# Patient Record
Sex: FEMALE | Race: WHITE | Employment: OTHER | URBAN - NONMETROPOLITAN AREA
[De-identification: names, ages, dates, MRNs, and addresses within clinical notes are randomized per-mention and may not be internally consistent; named-entity substitution may affect disease eponyms.]

---

## 2019-04-02 ENCOUNTER — APPOINTMENT (OUTPATIENT)
Dept: GENERAL RADIOLOGY | Age: 84
DRG: 190 | End: 2019-04-02
Payer: MEDICARE

## 2019-04-02 ENCOUNTER — APPOINTMENT (OUTPATIENT)
Dept: CT IMAGING | Age: 84
DRG: 190 | End: 2019-04-02
Payer: MEDICARE

## 2019-04-02 ENCOUNTER — HOSPITAL ENCOUNTER (INPATIENT)
Age: 84
LOS: 5 days | Discharge: NURSING, REHAB OR LONG-TERM CARE | DRG: 190 | End: 2019-04-07
Attending: EMERGENCY MEDICINE | Admitting: FAMILY MEDICINE
Payer: MEDICARE

## 2019-04-02 DIAGNOSIS — J44.1 COPD EXACERBATION (HCC): Primary | ICD-10-CM

## 2019-04-02 DIAGNOSIS — R09.02 HYPOXIA: ICD-10-CM

## 2019-04-02 PROBLEM — M79.606 LEG PAIN: Status: ACTIVE | Noted: 2019-04-02

## 2019-04-02 PROBLEM — J96.01 ACUTE HYPOXEMIC RESPIRATORY FAILURE (HCC): Status: ACTIVE | Noted: 2019-04-02

## 2019-04-02 PROBLEM — I10 ESSENTIAL HYPERTENSION: Status: ACTIVE | Noted: 2019-04-02

## 2019-04-02 LAB
ALBUMIN SERPL-MCNC: 4.4 G/DL (ref 3.5–5.2)
ALP BLD-CCNC: 107 U/L (ref 35–104)
ALT SERPL-CCNC: 12 U/L (ref 5–33)
ANION GAP SERPL CALCULATED.3IONS-SCNC: 13 MMOL/L (ref 7–19)
APTT: 25.8 SEC (ref 26–36.2)
AST SERPL-CCNC: 16 U/L (ref 5–32)
BASE EXCESS ARTERIAL: 3.7 MMOL/L (ref -2–2)
BILIRUB SERPL-MCNC: 0.4 MG/DL (ref 0.2–1.2)
BUN BLDV-MCNC: 19 MG/DL (ref 8–23)
CALCIUM SERPL-MCNC: 9.6 MG/DL (ref 8.2–9.6)
CARBOXYHEMOGLOBIN ARTERIAL: 2.1 % (ref 0–5)
CHLORIDE BLD-SCNC: 102 MMOL/L (ref 98–111)
CO2: 28 MMOL/L (ref 22–29)
CREAT SERPL-MCNC: 0.8 MG/DL (ref 0.5–0.9)
EKG P AXIS: 83 DEGREES
EKG P-R INTERVAL: 190 MS
EKG Q-T INTERVAL: 336 MS
EKG QRS DURATION: 78 MS
EKG QTC CALCULATION (BAZETT): 421 MS
EKG T AXIS: 66 DEGREES
GFR NON-AFRICAN AMERICAN: >60
GLUCOSE BLD-MCNC: 140 MG/DL (ref 74–109)
HCO3 ARTERIAL: 28.5 MMOL/L (ref 22–26)
HCT VFR BLD CALC: 44.3 % (ref 37–47)
HEMOGLOBIN, ART, EXTENDED: 12.7 G/DL (ref 12–16)
HEMOGLOBIN: 13.8 G/DL (ref 12–16)
INR BLD: 0.94 (ref 0.88–1.18)
LACTIC ACID: 0.8 MMOL/L (ref 0.5–1.9)
MCH RBC QN AUTO: 27.8 PG (ref 27–31)
MCHC RBC AUTO-ENTMCNC: 31.2 G/DL (ref 33–37)
MCV RBC AUTO: 89.3 FL (ref 81–99)
METHEMOGLOBIN ARTERIAL: 1.2 %
O2 CONTENT ARTERIAL: 15.2 ML/DL
O2 SAT, ARTERIAL: 85.2 %
O2 THERAPY: ABNORMAL
PCO2 ARTERIAL: 43 MMHG (ref 35–45)
PDW BLD-RTO: 14.3 % (ref 11.5–14.5)
PH ARTERIAL: 7.43 (ref 7.35–7.45)
PLATELET # BLD: 166 K/UL (ref 130–400)
PMV BLD AUTO: 11.2 FL (ref 9.4–12.3)
PO2 ARTERIAL: 45 MMHG (ref 80–100)
POTASSIUM SERPL-SCNC: 3.9 MMOL/L (ref 3.5–5)
POTASSIUM, WHOLE BLOOD: 3.8
PRO-BNP: 459 PG/ML (ref 0–1800)
PROTHROMBIN TIME: 12 SEC (ref 12–14.6)
RAPID INFLUENZA  B AGN: NEGATIVE
RAPID INFLUENZA A AGN: NEGATIVE
RBC # BLD: 4.96 M/UL (ref 4.2–5.4)
SODIUM BLD-SCNC: 143 MMOL/L (ref 136–145)
TOTAL PROTEIN: 7.8 G/DL (ref 6.6–8.7)
TROPONIN: <0.01 NG/ML (ref 0–0.03)
WBC # BLD: 9.2 K/UL (ref 4.8–10.8)

## 2019-04-02 PROCEDURE — 99285 EMERGENCY DEPT VISIT HI MDM: CPT

## 2019-04-02 PROCEDURE — 6360000002 HC RX W HCPCS: Performed by: FAMILY MEDICINE

## 2019-04-02 PROCEDURE — 6370000000 HC RX 637 (ALT 250 FOR IP): Performed by: FAMILY MEDICINE

## 2019-04-02 PROCEDURE — 85730 THROMBOPLASTIN TIME PARTIAL: CPT

## 2019-04-02 PROCEDURE — 87804 INFLUENZA ASSAY W/OPTIC: CPT

## 2019-04-02 PROCEDURE — 2500000003 HC RX 250 WO HCPCS: Performed by: EMERGENCY MEDICINE

## 2019-04-02 PROCEDURE — 6370000000 HC RX 637 (ALT 250 FOR IP): Performed by: EMERGENCY MEDICINE

## 2019-04-02 PROCEDURE — 87040 BLOOD CULTURE FOR BACTERIA: CPT

## 2019-04-02 PROCEDURE — 2580000003 HC RX 258: Performed by: EMERGENCY MEDICINE

## 2019-04-02 PROCEDURE — 96374 THER/PROPH/DIAG INJ IV PUSH: CPT

## 2019-04-02 PROCEDURE — 2700000000 HC OXYGEN THERAPY PER DAY

## 2019-04-02 PROCEDURE — 94762 N-INVAS EAR/PLS OXIMTRY CONT: CPT

## 2019-04-02 PROCEDURE — 84132 ASSAY OF SERUM POTASSIUM: CPT

## 2019-04-02 PROCEDURE — 6360000004 HC RX CONTRAST MEDICATION: Performed by: EMERGENCY MEDICINE

## 2019-04-02 PROCEDURE — 93005 ELECTROCARDIOGRAM TRACING: CPT

## 2019-04-02 PROCEDURE — 36415 COLL VENOUS BLD VENIPUNCTURE: CPT

## 2019-04-02 PROCEDURE — 6360000002 HC RX W HCPCS: Performed by: EMERGENCY MEDICINE

## 2019-04-02 PROCEDURE — 80053 COMPREHEN METABOLIC PANEL: CPT

## 2019-04-02 PROCEDURE — 99223 1ST HOSP IP/OBS HIGH 75: CPT | Performed by: FAMILY MEDICINE

## 2019-04-02 PROCEDURE — 71275 CT ANGIOGRAPHY CHEST: CPT

## 2019-04-02 PROCEDURE — 36600 WITHDRAWAL OF ARTERIAL BLOOD: CPT

## 2019-04-02 PROCEDURE — 85027 COMPLETE CBC AUTOMATED: CPT

## 2019-04-02 PROCEDURE — 94664 DEMO&/EVAL PT USE INHALER: CPT

## 2019-04-02 PROCEDURE — 85610 PROTHROMBIN TIME: CPT

## 2019-04-02 PROCEDURE — 83605 ASSAY OF LACTIC ACID: CPT

## 2019-04-02 PROCEDURE — 99285 EMERGENCY DEPT VISIT HI MDM: CPT | Performed by: EMERGENCY MEDICINE

## 2019-04-02 PROCEDURE — 83880 ASSAY OF NATRIURETIC PEPTIDE: CPT

## 2019-04-02 PROCEDURE — 82803 BLOOD GASES ANY COMBINATION: CPT

## 2019-04-02 PROCEDURE — 94640 AIRWAY INHALATION TREATMENT: CPT

## 2019-04-02 PROCEDURE — 84484 ASSAY OF TROPONIN QUANT: CPT

## 2019-04-02 PROCEDURE — 71045 X-RAY EXAM CHEST 1 VIEW: CPT

## 2019-04-02 PROCEDURE — 1210000000 HC MED SURG R&B

## 2019-04-02 PROCEDURE — 92610 EVALUATE SWALLOWING FUNCTION: CPT

## 2019-04-02 RX ORDER — LATANOPROST 50 UG/ML
1 SOLUTION/ DROPS OPHTHALMIC NIGHTLY
COMMUNITY

## 2019-04-02 RX ORDER — AMLODIPINE BESYLATE 5 MG/1
5 TABLET ORAL 2 TIMES DAILY
COMMUNITY

## 2019-04-02 RX ORDER — BUDESONIDE 0.25 MG/2ML
0.25 INHALANT ORAL 2 TIMES DAILY
Status: DISCONTINUED | OUTPATIENT
Start: 2019-04-02 | End: 2019-04-07 | Stop reason: HOSPADM

## 2019-04-02 RX ORDER — METHYLPREDNISOLONE SODIUM SUCCINATE 125 MG/2ML
125 INJECTION, POWDER, LYOPHILIZED, FOR SOLUTION INTRAMUSCULAR; INTRAVENOUS ONCE
Status: COMPLETED | OUTPATIENT
Start: 2019-04-02 | End: 2019-04-02

## 2019-04-02 RX ORDER — FUROSEMIDE 20 MG/1
20 TABLET ORAL DAILY
COMMUNITY

## 2019-04-02 RX ORDER — ONDANSETRON 2 MG/ML
4 INJECTION INTRAMUSCULAR; INTRAVENOUS EVERY 6 HOURS PRN
Status: DISCONTINUED | OUTPATIENT
Start: 2019-04-02 | End: 2019-04-07 | Stop reason: HOSPADM

## 2019-04-02 RX ORDER — SODIUM CHLORIDE 0.9 % (FLUSH) 0.9 %
10 SYRINGE (ML) INJECTION EVERY 12 HOURS SCHEDULED
Status: DISCONTINUED | OUTPATIENT
Start: 2019-04-02 | End: 2019-04-07 | Stop reason: HOSPADM

## 2019-04-02 RX ORDER — ACETAMINOPHEN 325 MG/1
650 TABLET ORAL EVERY 4 HOURS PRN
Status: DISCONTINUED | OUTPATIENT
Start: 2019-04-02 | End: 2019-04-07 | Stop reason: HOSPADM

## 2019-04-02 RX ORDER — IPRATROPIUM BROMIDE AND ALBUTEROL SULFATE 2.5; .5 MG/3ML; MG/3ML
1 SOLUTION RESPIRATORY (INHALATION) ONCE
Status: COMPLETED | OUTPATIENT
Start: 2019-04-02 | End: 2019-04-02

## 2019-04-02 RX ORDER — OMEPRAZOLE 20 MG/1
20 CAPSULE, DELAYED RELEASE ORAL DAILY
COMMUNITY

## 2019-04-02 RX ORDER — IPRATROPIUM BROMIDE AND ALBUTEROL SULFATE 2.5; .5 MG/3ML; MG/3ML
1 SOLUTION RESPIRATORY (INHALATION)
Status: DISCONTINUED | OUTPATIENT
Start: 2019-04-02 | End: 2019-04-07 | Stop reason: HOSPADM

## 2019-04-02 RX ORDER — ALBUTEROL SULFATE 2.5 MG/3ML
2.5 SOLUTION RESPIRATORY (INHALATION) 4 TIMES DAILY
Status: DISCONTINUED | OUTPATIENT
Start: 2019-04-02 | End: 2019-04-02

## 2019-04-02 RX ORDER — IPRATROPIUM BROMIDE AND ALBUTEROL SULFATE 2.5; .5 MG/3ML; MG/3ML
1 SOLUTION RESPIRATORY (INHALATION) ONCE
Status: DISCONTINUED | OUTPATIENT
Start: 2019-04-02 | End: 2019-04-02

## 2019-04-02 RX ORDER — MULTIVIT,THER.W-IRON,HEMATINIC 66.7-.33MG
1 TABLET ORAL DAILY
COMMUNITY

## 2019-04-02 RX ORDER — IPRATROPIUM BROMIDE AND ALBUTEROL SULFATE 2.5; .5 MG/3ML; MG/3ML
1 SOLUTION RESPIRATORY (INHALATION) EVERY 6 HOURS
COMMUNITY

## 2019-04-02 RX ORDER — SODIUM CHLORIDE 0.9 % (FLUSH) 0.9 %
10 SYRINGE (ML) INJECTION PRN
Status: DISCONTINUED | OUTPATIENT
Start: 2019-04-02 | End: 2019-04-07 | Stop reason: HOSPADM

## 2019-04-02 RX ORDER — METHYLPREDNISOLONE SODIUM SUCCINATE 40 MG/ML
40 INJECTION, POWDER, LYOPHILIZED, FOR SOLUTION INTRAMUSCULAR; INTRAVENOUS DAILY
Status: DISCONTINUED | OUTPATIENT
Start: 2019-04-02 | End: 2019-04-07

## 2019-04-02 RX ORDER — BUDESONIDE AND FORMOTEROL FUMARATE DIHYDRATE 80; 4.5 UG/1; UG/1
2 AEROSOL RESPIRATORY (INHALATION) 2 TIMES DAILY
COMMUNITY

## 2019-04-02 RX ORDER — DORZOLAMIDE HCL 20 MG/ML
2 SOLUTION/ DROPS OPHTHALMIC 2 TIMES DAILY
COMMUNITY

## 2019-04-02 RX ORDER — DORZOLAMIDE HCL 20 MG/ML
2 SOLUTION/ DROPS OPHTHALMIC 3 TIMES DAILY
Status: DISCONTINUED | OUTPATIENT
Start: 2019-04-02 | End: 2019-04-07 | Stop reason: HOSPADM

## 2019-04-02 RX ORDER — LATANOPROST 50 UG/ML
1 SOLUTION/ DROPS OPHTHALMIC 2 TIMES DAILY
Status: DISCONTINUED | OUTPATIENT
Start: 2019-04-02 | End: 2019-04-07 | Stop reason: HOSPADM

## 2019-04-02 RX ADMIN — IOPAMIDOL 90 ML: 755 INJECTION, SOLUTION INTRAVENOUS at 08:08

## 2019-04-02 RX ADMIN — BUDESONIDE 250 MCG: 0.25 SUSPENSION RESPIRATORY (INHALATION) at 19:30

## 2019-04-02 RX ADMIN — METHYLPREDNISOLONE SODIUM SUCCINATE 125 MG: 125 INJECTION, POWDER, FOR SOLUTION INTRAMUSCULAR; INTRAVENOUS at 07:39

## 2019-04-02 RX ADMIN — DOXYCYCLINE 100 MG: 100 INJECTION, POWDER, LYOPHILIZED, FOR SOLUTION INTRAVENOUS at 23:08

## 2019-04-02 RX ADMIN — IPRATROPIUM BROMIDE AND ALBUTEROL SULFATE 1 AMPULE: .5; 3 SOLUTION RESPIRATORY (INHALATION) at 06:50

## 2019-04-02 RX ADMIN — ACETAMINOPHEN 650 MG: 325 TABLET, FILM COATED ORAL at 17:34

## 2019-04-02 RX ADMIN — ENOXAPARIN SODIUM 40 MG: 100 INJECTION SUBCUTANEOUS at 16:27

## 2019-04-02 RX ADMIN — METHYLPREDNISOLONE SODIUM SUCCINATE 40 MG: 40 INJECTION, POWDER, FOR SOLUTION INTRAMUSCULAR; INTRAVENOUS at 16:26

## 2019-04-02 RX ADMIN — ONDANSETRON 4 MG: 2 INJECTION INTRAMUSCULAR; INTRAVENOUS at 16:26

## 2019-04-02 RX ADMIN — LATANOPROST 1 DROP: 50 SOLUTION OPHTHALMIC at 16:27

## 2019-04-02 RX ADMIN — DOXYCYCLINE 100 MG: 100 INJECTION, POWDER, LYOPHILIZED, FOR SOLUTION INTRAVENOUS at 10:07

## 2019-04-02 RX ADMIN — IPRATROPIUM BROMIDE AND ALBUTEROL SULFATE 1 AMPULE: .5; 3 SOLUTION RESPIRATORY (INHALATION) at 19:29

## 2019-04-02 SDOH — HEALTH STABILITY: MENTAL HEALTH: HOW OFTEN DO YOU HAVE A DRINK CONTAINING ALCOHOL?: NEVER

## 2019-04-02 ASSESSMENT — ENCOUNTER SYMPTOMS
RHINORRHEA: 0
SORE THROAT: 0
COUGH: 1
BACK PAIN: 0
ABDOMINAL PAIN: 0
VOMITING: 0
NAUSEA: 0
DIARRHEA: 0
WHEEZING: 1
SHORTNESS OF BREATH: 1

## 2019-04-02 ASSESSMENT — PAIN SCALES - GENERAL: PAINLEVEL_OUTOF10: 9

## 2019-04-02 NOTE — ED NOTES
ASSESSMENT:    PT ALERT/ORIENTED X4. PUPILS EQUAL/REACTIVE    SKIN:  WARM/DRY PINK CAPILLARY REFILL < 2SECS    CARDIAC:  S1 S2 NOTED     LUNGS: decreased sounds UPPER AND LOWER LOBES, RESPIRATIONS EVEN/UNLABORED nonproductive cough noted     ABDOMEN: BOWEL SOUNDS NOTED UPPER AND LOWER QUADRANTS                     SOFT AND NONTENDER. EXTREMITIES:  BILATERAL DP AND PT AND NO EDEMA NOTED. NO DISTRESS NOTED. SIDE RAILS UP AND CALL LIGHT IN REACH.      Antony Blanco RN  04/02/19 1880

## 2019-04-02 NOTE — LETTER
Beneficiary Notification Letter     This [facility] Provider is Participating in an Innovative Payment and [address] from Zainab Glover:   JO ANN SAGASTUME is participating in a Medicare initiative called the [facility] for [address] [facility]. You are receiving this letter because your health care provider has identified you as a patient who is receiving care through this initiative. Health care providers participating in the [facility] [address] [facility], including JO ANN SAGASTUME, will work with Medicare to improve care for patients. Your Medicare rights have not been changed. You still have all the same Medicare rights and protections, including the right to choose which hospital, doctor, or other health care provider you see. However, because JO ANN SAGASTUME chose to participate in the [facility], all Medicare beneficiaries who meet the eligibility criteria of this initiative will receive care under the initiative. If you do not wish to receive care under the [employer] [facility] [address] [facility], you must choose a health care provider that does not participate in this initiative for your care. Regardless of which health care provider you see, Medicare will continue to cover all of your medically necessary services. Bundled Payments for Care Improvement Advanced aims to help improve your care     The [employer] [facility] [address] is an innovative Medicare initiative that encourages your doctors, hospitals, and other health care providers to work more closely together so you get better care during and following certain hospital stays.  In this initiative, doctors and hospitals may work closely with certain health care providers and suppliers that help patients recover after discharge from the hospital, · To find a different doctor, visit Medicare's Physician Compare website, HDTapes.co.nz, or call 1-800-MEDICARE (117 2715). TTY users should call 3-293.136.5274. · To find a different skilled nursing facility, visit [facility] website, https://www.5 Minutes.Allurent/, or call 1-800-MEDICARE (1- 652.473.9826). TTY users should call 7-647.494.5688. · To find a different long term care hospital, visit [facility] Compare website, SolsticelogMaxCDN.be, or call 1-800- MEDICARE (593 0207). TTY users should call 8-675.198.5382. · To find a different inpatient rehabilitation facility, visit [facility] Compare website, www.medicare.gov/ inpatientrehabilitation facilitycompare, or call 1-800-MEDICARE (8-809.676.4649). TTY users should call 8- 802.197.5992. · To find a different home health agency, visit [address] website, www.medicare.gov/homehealthcompare, or call 1-800-MEDICARE (3-266- 411-1547). TTY users should call 6-309.744.3918.

## 2019-04-02 NOTE — PROGRESS NOTES
Speech Language Pathology  Facility/Department: [facility] 4 ONCOLOGY UNIT   BEDSIDE SWALLOW EVALUATION    NAME: Tabby Bliss  : 1924  MRN: [id number]    ADMISSION DATE: 2019  ADMITTING DIAGNOSIS: has Hypoxemia; Leg pain; Acute hypoxemic respiratory failure (HCC); COPD exacerbation ([facility] [address] and Essential hypertension on their problem list.    Date of Eval: 2019  Evaluating Therapist: Kelle Kruse    Current Diet level:  Current Diet : NPO  Current Liquid Diet : NPO    Reason for Referral  Tabby Bliss was referred for a bedside swallow evaluation to assess the efficiency of her swallow function, identify signs and symptoms of aspiration and make recommendations regarding safe dietary consistencies, effective compensatory strategies, and safe eating environment. Impression  Assessed patient's swallowing function. Patient exhibits slow, decreased oral prep of even blended food consistency, fast oral transit and suspected swallow delay with thin liquids, and sluggish, mild-moderately decreased laryngeal elevation for swallow airway protection. Even so, no outward S/S penetration/aspiration was noted with any ice chip trial, puree consistency trial, nectar thick H2O trial, or thin H2O trial administered during evaluation this date. At this time, question fatigue. Would trial full liquid diet with nectar thick liquids. Recommend meds crushed in pudding or applesauce. TOTAL FEED. If patient receives mouth care prior to intake, okay for ice chips IN BETWEEN MEALS for comfort. Thank you for this consult. Treatment Plan  Requires SLP Intervention: Yes    Recommended Diet and Intervention  Liquid Consistency Recommendation: Full(With nectar thick)  Recommended Form of Meds: Crushed in puree as able  Therapeutic Interventions: Patient/Family education;Diet tolerance monitoring; Therapeutic PO trials with SLP    Compensatory Swallowing Strategies  Compensatory Swallowing Strategies: Upright as possible for all oral intake; Total feed;Small bites/sips;Eat/Feed slowly; Alternate solids and liquids; Remain upright for 30-45 minutes after meals    Treatment/Goals  Timeframe for Short-term Goals: 1x/day for 3 days   Goal 1: Patient will tolerate full liquid diet with nectar thick liquids with min S/S penetration/aspiration during PO intake. Goal 2: Patient staff will follow swallow safety recommendations to decrease risk of penetration/aspiration during PO intake. Goal 3: Re-assessment of swallow function for potential diet upgrade. General  Chart Reviewed: Yes  Behavior/Cognition: Alert; Cooperative;Pleasant mood  O2 Device: Venturi mask  Communication Observation: (Patient exhibited min verbalizations. What patient verbalized, low volume was noted.)  Follows Directions: Simple  Patient Positioning: Upright in bed  Volitional Cough: Weak  Volitional Swallow: Delayed  Consistencies Administered: Ice Chips;Puree;Nectar - straw; Thin - straw    Assessed patient's swallowing function with the following observations noted;    Oral Phase Dysfunction  Oral Phase: Exceptions  Oral Phase Dysfunction  Impaired Mastication: Ice chips;Puree(Patient exhibited slow oral prep with primarily decreased vertical jaw movement noted during single ice chip trials and puree consistency trials presented by SLP. )  Suspected Premature Bolus Loss: Thin - straw(Patient exhibited fast oral transit of thin H2O trials presented via straw by SLP. )  Oral Phase - Comment: Oral transit of ice chip trials primarily measured 1-2 seconds in length. Oral transit of puree consistency trials, presented by SLP, primarily measured 1-2 seconds in length and no oral cavity residue was noted post swallows. Oral transit of nectar thick H2O trials, presented via straw by SLP, primarily measured 1 second in length. Indicators of Pharyngeal Phase Dysfunction  Pharyngeal Phase: Exceptions  Indicators of Pharyngeal Phase Dysfunction  Delayed Swallow:  Thin - straw(Suspect secondary to oral transit times.)  Decreased Laryngeal Elevation: (Laryngeal elevation was considered to be sluggish and mild-moderately decreased for swallow airway protection.)   Pharyngeal: No outward S/S penetration/aspiration was noted with any ice chip trial, puree consistency trial, or nectar thick H2O trial. Despite exhibiting fast oral transit and suspected swallow delay, no outward S/S penetration/aspiration was observed with any thin H2O trial.     At this time, question fatigue. Would trial full liquid diet with nectar thick liquids. Recommend meds crushed in pudding or applesauce. TOTAL FEED. If patient receives mouth care prior to intake, okay for ice chips IN BETWEEN MEALS for comfort.      Electronically signed by SEVEN Farias on 4/2/2019 at 3:19 PM

## 2019-04-02 NOTE — PROGRESS NOTES
Patient daughter, Jose Serrato, [phone].  Electronically signed by Kimberlyn Daly RN on 4/2/2019 at 3:46 PM

## 2019-04-02 NOTE — H&P
ophthalmic solution Place 2 drops into both eyes 3 times daily   Yes Historical Provider, MD   albuterol-ipratropium (COMBIVENT RESPIMAT)  MCG/ACT AERS inhaler Inhale 1 puff into the lungs every 6 hours   Yes Historical Provider, MD     Allergies:    Patient has no known allergies. Social History:    The patient currently lives at [facility]  Tobacco:   reports that she has never smoked. She has never used smokeless tobacco.  Alcohol:   reports that she does not drink alcohol. Illicit Drugs: Never    Family History:      Problem Relation Age of Onset    Heart Disease Mother     Heart Attack Mother     Prostate Cancer Father        Review of Systems:   Constitutional / general:  No fever / chills / sweats  HEENT: No sore throat / hoarseness / vision changes  CV:  No chest pain / palpitations/ orthopnea   Respiratory:  No sputum / hemoptysis  GI: No nausea / vomiting / abdominal pain / diarrhea / constipation  :  No dysuria / hesitancy / urgency / hematuria   Neuro: No muscle weakness / dysphagia / headache / paresthesias  Musculoskeletal:  No edema / cyanosis / pain  Psychiatric:  No depression / anxiety / insomnia  Skin:  No new rashes / lesions    Physical Examination:            BP (!) 146/79   Pulse 100   Temp 98.9 °F (37.2 °C)   Resp 20   Ht 5' 5\" (1.651 m)   Wt 107 lb (48.5 kg)   SpO2 95%   BMI 17.81 kg/m²   General appearance: Dyspneic at rest, appears stated age and cooperative. Well developed and well groomed. Elderly and debilitated female. HEENT: Normal cephalic, atraumatic without obvious deformity. Pupils equal, round, and reactive to light. Extra ocular muscles intact. Conjunctivae/corneas clear. Normal ears and nose. Hearing impaired. Lips with no blisters. Neck: Supple, with full range of motion. Trachea midline. Thyroid no masses noted. Respiratory: Increased respiratory effort. Bilateral wheezing.   Cardiovascular: Regular rate and rhythm with normal S1/S2 without murmurs, rubs or gallops. Capillary refill Normal. Pulses symmetric in upper extremities. Abdomen: Soft, non-tender, non-distended with normal bowel sounds. No organomegaly. No hernias. Musculoskeletal: No clubbing, cyanosis or edema bilaterally. Full range of motion without deformity in 4 extremities. Skin: Skin color, texture, turgor normal.  No rashes or lesions. No nodules. Neurologic:  Neurovascularly intact without any focal sensory/motor deficits. Cranial nerves: II-XII intact, grossly non-focal.  Psychiatric: Alert and oriented, thought content appropriate, normal insight. Normal memory.     Diagnostic Data:  CXR: I have reviewed the report and films with the following interpretation: Noted  CT PE: I have reviewed the report and films with the following interpretation: noted    CBC:  Recent Labs     04/02/19  0640   WBC 9.2   HGB 13.8   HCT 44.3        BMP:  Recent Labs     04/02/19  0640 04/02/19  0643     --    K 3.9 3.8     --    CO2 28  --    BUN 19  --    CREATININE 0.8  --    CALCIUM 9.6  --      Recent Labs     04/02/19  0640   AST 16   ALT 12   BILITOT 0.4   ALKPHOS 107*     Coag Panel:   Recent Labs     04/02/19  0640   INR 0.94   PROTIME 12.0   APTT 25.8*     Cardiac Enzymes:   Recent Labs     04/02/19 0640   TROPONINI <0.01     ABGs:  Lab Results   Component Value Date    PHART 7.430 04/02/2019    PO2ART 45.0 04/02/2019    FOE4RUF 43.0 04/02/2019     Urinalysis:  Lab Results   Component Value Date    NITRU NEGATIVE 02/21/2014    BACTERIA 2+ 02/21/2014    GLUCOSEU NEGATIVE 02/21/2014       Active Hospital Problems    Diagnosis Date Noted    Palliative care patient [Z51.5] 04/03/2019    Leg pain [M79.606] 04/02/2019    Acute hypoxemic respiratory failure (HCC) [J96.01] 04/02/2019    COPD exacerbation ([facility] [address] [J44.1] 04/02/2019    Essential hypertension [I10] 04/02/2019       Assessment and Plan:  Principal Problem:    Acute hypoxemic respiratory failure (HCC)  Active Problems:    Leg pain    COPD exacerbation ([facility] [address]    Essential hypertension    Palliative care patient  Resolved Problems:    * No resolved hospital problems. *      1. Admit to medical floor inpatient. Serial vitals, telemetry, diet npo, activity up with assistance. 2. IVF saline slow rate  3. Follow labs CBC/BMP  4. Order tests: Respiratory virus PCR  5. Consults: SLP/PT/OT  6. Start medications See orders  7. Home medications reconciled. 8. DVT prophylaxis with Lovenox  9. Code status DNR, discussed with patient and family  8. Oxygen 2-4 lpm nasal canula, baseline goal room air  11. Disposition medical floor  12.  Prognosis poor due to respiratory failure and advanced age      Dre Mccartney service  4/2/2019  12:44 PM

## 2019-04-02 NOTE — PROGRESS NOTES
The patient's O2 sats have been declining. The patient's HR has been in the 120s steadily. Dr. Kat Mahajan has been notified.    Electronically signed by Jalil Altman RN on 4/2/2019 at 5:58 PM

## 2019-04-02 NOTE — PROGRESS NOTES
Contains critical data Blood Gas, Arterial   Status:  Final result    Ref Range & Units 04/02/19 0643   pH, Arterial 7.350 - 7.450 7.430    pCO2, Arterial 35.0 - 45.0 mmHg 43.0    pO2, Arterial 80.0 - 100.0 mmHg 45. 0Low Panic     HCO3, Arterial 22.0 - 26.0 mmol/L 28.5High     Base Excess, Arterial -2.0 - 2.0 mmol/L 3.7High     Hemoglobin, Art, Extended 12.0 - 16.0 g/dL 12.7    O2 Sat, Arterial >92 % 85. 2Low Panic     Carboxyhgb, Arterial 0.0 - 5.0 % 2.1    Comment:      0.0-1.5   (Smokers 1.5-5.0)    Methemoglobin, Arterial <1.5 % 1.2    O2 Content, Arterial Not Established mL/dL 15.2    O2 Therapy  Unknown    Resulting Agency  [facility]   Narrative     CALL  Sanon Robert Loco RN ER, 04/02/2019 06:44, by ANABELL        Specimen Collected: 04/02/19 0643 Last Resulted: 04/02/19 0644 View Other Order Details        Pt on room air for ABG, RR 24 site RB

## 2019-04-02 NOTE — ED PROVIDER NOTES
[facility] 4 ONCOLOGY UNIT  eMERGENCY dEPARTMENT eNCOUnter      Pt Name: Elvira Carbajal  MRN: [id number]  [place] 4/2/1924  Date of evaluation: 4/2/2019  Provider: Hakeem Roy MD    [address] [place] [address]       Chief Complaint   Patient presents with    Shortness of Breath    Cough     x 2         HISTORY OF PRESENT ILLNESS   (Location/Symptom, Timing/Onset,Context/Setting, Quality, Duration, Modifying Factors, Severity)  Note limiting factors. Elvira Carbajal is a 80 y.o. female who presents to the emergency department with cough shortness of breath and wheezing and subjective fever. The patient states that she lives  nursing home. Symptoms present for 2 days and worsening. She does have a history of COPD, though nonsmoker. She does not wear home oxygen. Her room air sat was 85%. HPI    NursingNotes were reviewed. REVIEW OF SYSTEMS    (2-9 systems for level 4, 10 or more for level 5)     Review of Systems   Constitutional: Positive for activity change, appetite change, chills, diaphoresis, fatigue and fever. HENT: Negative for rhinorrhea and sore throat. Respiratory: Positive for cough, shortness of breath and wheezing. Cardiovascular: Negative for chest pain and leg swelling. Gastrointestinal: Negative for abdominal pain, diarrhea, nausea and vomiting. Genitourinary: Negative for difficulty urinating. Musculoskeletal: Negative for back pain and neck pain. Skin: Negative for rash. Neurological: Negative for weakness and headaches. Psychiatric/Behavioral: Negative for confusion. A complete review of systems was performed and is negative except as noted above in the HPI.        PAST MEDICAL HISTORY     Past Medical History:   Diagnosis Date    Anemia     Arthritis     Back pain     COPD (chronic obstructive pulmonary disease) ([facility] [address]     Glaucoma     Hypertension     Lumbar disc disease     Movement disorder     Pneumonia     Popliteal nerve injury     Reflux esophagitis Currently   Lifestyle    Physical activity:     Days per week: None     Minutes per session: None    Stress: None   Relationships    Social connections:     Talks on phone: None     Gets together: None     Attends [employer] service: None     Active member of club or organization: None     Attends meetings of clubs or organizations: None     Relationship status: None    Intimate partner violence:     Fear of current or ex partner: None     Emotionally abused: None     Physically abused: None     Forced sexual activity: None   Other Topics Concern    None   Social History Narrative    None       SCREENINGS    Jeff Coma Scale  Eye Opening: Spontaneous  Best Verbal Response: Oriented  Best Motor Response: Obeys commands  [place] Coma Scale Score: 15        PHYSICAL EXAM    (up to 7 for level 4, 8 or more for level 5)     ED Triage Vitals [04/02/19 0630]   BP Temp Temp src Pulse Resp SpO2 Height Weight   134/61 98.8 °F (37.1 °C) -- 111 (!) 33 (!) 84 % 5' 5\" (1.651 m) 107 lb (48.5 kg)       Physical Exam   Constitutional: She is oriented to person, place, and time. She appears well-developed and well-nourished. No distress. HENT:   Head: Normocephalic and atraumatic. Eyes: Pupils are equal, round, and reactive to light. Neck: Normal range of motion. Neck supple. Cardiovascular: Regular rhythm, normal heart sounds and intact distal pulses. Tachycardia present. Pulmonary/Chest: Effort normal. Tachypnea noted. No respiratory distress. She has wheezes. Abdominal: Soft. Bowel sounds are normal. She exhibits no distension. There is no tenderness. Musculoskeletal: Normal range of motion. She exhibits no edema. Neurological: She is alert and oriented to person, place, and time. No cranial nerve deficit. She exhibits normal muscle tone. Coordination normal.   Skin: Skin is warm and dry. No rash noted. She is not diaphoretic. Psychiatric: She has a normal mood and affect.  Her behavior is normal.   Nursing note and vitals reviewed. DIAGNOSTIC RESULTS     EKG: All EKG's are interpreted by the Emergency Department Physician who either signs or Co-signs this chart in the absence of a cardiologist.    Sinus tach rate 109 PVCs nonspecific ST    RADIOLOGY:   Non-plain film images such as CT, Ultrasound and MRI are read by the radiologist. Plainradiographic images are visualized and preliminarily interpreted by the emergency physician with the below findings:      Interpretation per the Radiologist below, if available at the time of this note:    CTA PULMONARY W CONTRAST   Final Result   1. No acute findings. No pulmonary embolus. No thoracic aortic   dissection. 2. Incidental cholelithiasis. Signed by Dr Romi Corrales on 4/2/2019 8:38 AM      XR CHEST PORTABLE   Final Result   1. No radiographic evidence of acute cardiopulmonary process.    Signed by Dr Rosalba Morrison on 4/2/2019 7:15 AM            ED BEDSIDE ULTRASOUND:   Performed by ED Physician - none    LABS:  Labs Reviewed   APTT - Abnormal; Notable for the following components:       Result Value    aPTT 25.8 (*)     All other components within normal limits   BLOOD GAS, ARTERIAL - Abnormal; Notable for the following components:    pO2, Arterial 45.0 (*)     HCO3, Arterial 28.5 (*)     Base Excess, Arterial 3.7 (*)     O2 Sat, Arterial 85.2 (*)     All other components within normal limits    Narrative:     CALL  Fab Wasserman RN ER, 04/02/2019 06:44, by Richard Gonzalez   CBC - Abnormal; Notable for the following components:    MCHC 31.2 (*)     All other components within normal limits   COMPREHENSIVE METABOLIC PANEL - Abnormal; Notable for the following components:    Glucose 140 (*)     Alkaline Phosphatase 107 (*)     All other components within normal limits   RAPID INFLUENZA A/B ANTIGENS   CULTURE BLOOD #1   CULTURE BLOOD #2   RESPIRATORY VIRUS PCR PANEL   BRAIN NATRIURETIC PEPTIDE   PROTIME-INR   TROPONIN   POTASSIUM, WHOLE BLOOD    Narrative:

## 2019-04-02 NOTE — ED NOTES
Bed: 07  Expected date:   Expected time:   Means of arrival:   Comments:  Kristina Stack RN  04/02/19 [id number]

## 2019-04-03 PROBLEM — Z51.5 PALLIATIVE CARE PATIENT: Status: ACTIVE | Noted: 2019-04-03

## 2019-04-03 LAB
ANION GAP SERPL CALCULATED.3IONS-SCNC: 15 MMOL/L (ref 7–19)
BASOPHILS ABSOLUTE: 0 K/UL (ref 0–0.2)
BASOPHILS RELATIVE PERCENT: 0.2 % (ref 0–1)
BUN BLDV-MCNC: 25 MG/DL (ref 8–23)
CALCIUM SERPL-MCNC: 10 MG/DL (ref 8.2–9.6)
CHLORIDE BLD-SCNC: 105 MMOL/L (ref 98–111)
CO2: 24 MMOL/L (ref 22–29)
CREAT SERPL-MCNC: 0.8 MG/DL (ref 0.5–0.9)
EOSINOPHILS ABSOLUTE: 0 K/UL (ref 0–0.6)
EOSINOPHILS RELATIVE PERCENT: 0 % (ref 0–5)
GFR NON-AFRICAN AMERICAN: >60
GLUCOSE BLD-MCNC: 171 MG/DL (ref 74–109)
HCT VFR BLD CALC: 43.1 % (ref 37–47)
HEMOGLOBIN: 13.3 G/DL (ref 12–16)
LYMPHOCYTES ABSOLUTE: 0.6 K/UL (ref 1.1–4.5)
LYMPHOCYTES RELATIVE PERCENT: 2.6 % (ref 20–40)
MCH RBC QN AUTO: 27.8 PG (ref 27–31)
MCHC RBC AUTO-ENTMCNC: 30.9 G/DL (ref 33–37)
MCV RBC AUTO: 90 FL (ref 81–99)
MONOCYTES ABSOLUTE: 1.1 K/UL (ref 0–0.9)
MONOCYTES RELATIVE PERCENT: 4.7 % (ref 0–10)
NEUTROPHILS ABSOLUTE: 21 K/UL (ref 1.5–7.5)
NEUTROPHILS RELATIVE PERCENT: 92.1 % (ref 50–65)
PDW BLD-RTO: 14.4 % (ref 11.5–14.5)
PLATELET # BLD: 186 K/UL (ref 130–400)
PMV BLD AUTO: 11.8 FL (ref 9.4–12.3)
POTASSIUM REFLEX MAGNESIUM: 4.2 MMOL/L (ref 3.5–5)
RBC # BLD: 4.79 M/UL (ref 4.2–5.4)
SODIUM BLD-SCNC: 144 MMOL/L (ref 136–145)
WBC # BLD: 22.8 K/UL (ref 4.8–10.8)

## 2019-04-03 PROCEDURE — 36415 COLL VENOUS BLD VENIPUNCTURE: CPT

## 2019-04-03 PROCEDURE — 2580000003 HC RX 258: Performed by: FAMILY MEDICINE

## 2019-04-03 PROCEDURE — 6370000000 HC RX 637 (ALT 250 FOR IP): Performed by: FAMILY MEDICINE

## 2019-04-03 PROCEDURE — 6360000002 HC RX W HCPCS: Performed by: FAMILY MEDICINE

## 2019-04-03 PROCEDURE — 94762 N-INVAS EAR/PLS OXIMTRY CONT: CPT

## 2019-04-03 PROCEDURE — 1210000000 HC MED SURG R&B

## 2019-04-03 PROCEDURE — 85025 COMPLETE CBC W/AUTO DIFF WBC: CPT

## 2019-04-03 PROCEDURE — 94640 AIRWAY INHALATION TREATMENT: CPT

## 2019-04-03 PROCEDURE — 80048 BASIC METABOLIC PNL TOTAL CA: CPT

## 2019-04-03 PROCEDURE — 2580000003 HC RX 258: Performed by: EMERGENCY MEDICINE

## 2019-04-03 PROCEDURE — 99232 SBSQ HOSP IP/OBS MODERATE 35: CPT | Performed by: FAMILY MEDICINE

## 2019-04-03 PROCEDURE — 2500000003 HC RX 250 WO HCPCS: Performed by: EMERGENCY MEDICINE

## 2019-04-03 PROCEDURE — 2700000000 HC OXYGEN THERAPY PER DAY

## 2019-04-03 PROCEDURE — 87633 RESP VIRUS 12-25 TARGETS: CPT

## 2019-04-03 PROCEDURE — 92526 ORAL FUNCTION THERAPY: CPT

## 2019-04-03 RX ADMIN — METHYLPREDNISOLONE SODIUM SUCCINATE 40 MG: 40 INJECTION, POWDER, FOR SOLUTION INTRAMUSCULAR; INTRAVENOUS at 10:46

## 2019-04-03 RX ADMIN — DOXYCYCLINE 100 MG: 100 INJECTION, POWDER, LYOPHILIZED, FOR SOLUTION INTRAVENOUS at 22:00

## 2019-04-03 RX ADMIN — CEFTRIAXONE 1 G: 1 INJECTION, POWDER, FOR SOLUTION INTRAMUSCULAR; INTRAVENOUS at 16:10

## 2019-04-03 RX ADMIN — ACETAMINOPHEN 650 MG: 325 TABLET, FILM COATED ORAL at 14:52

## 2019-04-03 RX ADMIN — IPRATROPIUM BROMIDE AND ALBUTEROL SULFATE 1 AMPULE: .5; 3 SOLUTION RESPIRATORY (INHALATION) at 07:08

## 2019-04-03 RX ADMIN — DORZOLAMIDE HYDROCHLORIDE 2 DROP: 20 SOLUTION/ DROPS OPHTHALMIC at 10:45

## 2019-04-03 RX ADMIN — IPRATROPIUM BROMIDE AND ALBUTEROL SULFATE 1 AMPULE: .5; 3 SOLUTION RESPIRATORY (INHALATION) at 19:34

## 2019-04-03 RX ADMIN — DOXYCYCLINE 100 MG: 100 INJECTION, POWDER, LYOPHILIZED, FOR SOLUTION INTRAVENOUS at 10:46

## 2019-04-03 RX ADMIN — IPRATROPIUM BROMIDE AND ALBUTEROL SULFATE 1 AMPULE: .5; 3 SOLUTION RESPIRATORY (INHALATION) at 15:16

## 2019-04-03 RX ADMIN — BUDESONIDE 250 MCG: 0.25 SUSPENSION RESPIRATORY (INHALATION) at 19:34

## 2019-04-03 RX ADMIN — DORZOLAMIDE HYDROCHLORIDE 2 DROP: 20 SOLUTION/ DROPS OPHTHALMIC at 14:00

## 2019-04-03 RX ADMIN — BUDESONIDE 250 MCG: 0.25 SUSPENSION RESPIRATORY (INHALATION) at 07:10

## 2019-04-03 RX ADMIN — IPRATROPIUM BROMIDE AND ALBUTEROL SULFATE 1 AMPULE: .5; 3 SOLUTION RESPIRATORY (INHALATION) at 10:45

## 2019-04-03 RX ADMIN — ENOXAPARIN SODIUM 40 MG: 100 INJECTION SUBCUTANEOUS at 14:00

## 2019-04-03 RX ADMIN — ACETAMINOPHEN 650 MG: 325 TABLET, FILM COATED ORAL at 03:06

## 2019-04-03 RX ADMIN — LATANOPROST 1 DROP: 50 SOLUTION OPHTHALMIC at 18:00

## 2019-04-03 RX ADMIN — LATANOPROST 1 DROP: 50 SOLUTION OPHTHALMIC at 10:45

## 2019-04-03 RX ADMIN — Medication 10 ML: at 10:46

## 2019-04-03 ASSESSMENT — PAIN DESCRIPTION - PAIN TYPE: TYPE: CHRONIC PAIN

## 2019-04-03 ASSESSMENT — PAIN SCALES - GENERAL
PAINLEVEL_OUTOF10: 0
PAINLEVEL_OUTOF10: 3
PAINLEVEL_OUTOF10: 0

## 2019-04-03 ASSESSMENT — PAIN DESCRIPTION - LOCATION: LOCATION: BACK

## 2019-04-03 NOTE — PROGRESS NOTES
[facility]      Patient:  Augusto Martínez  YOB: 1924  Date of Service: 4/3/2019  MRN: [id number]   Acct: [de-identified]   Primary Care Physician: Hillary Gruber  Advance Directive: DNR-CC  Admit Date: 4/2/2019       Hospital Day: 1    CHIEF COMPLAINT Shortness of breath    Subjective:  Breathing is still impaired. Tolerating puree diet. Objective:   VITALS:  BP (!) 144/74   Pulse 110   Temp 99.1 °F (37.3 °C) (Temporal)   Resp 24   Ht 5' 5\" (1.651 m)   Wt 107 lb (48.5 kg)   SpO2 97%   BMI 17.81 kg/m²   24HR INTAKE/OUTPUT:      Intake/Output Summary (Last 24 hours) at 4/3/2019 1611  Last data filed at 4/3/2019 1001  Gross per 24 hour   Intake 300 ml   Output --   Net 300 ml     Constitutional: Oriented to person, place, and time. Well-developed and well-nourished. Dyspneic at rest.   HENT:  Head: Normocephalic and atraumatic.    Mouth/Throat: Oropharynx is clear and moist. No oropharyngeal exudate. Eyes: Conjunctivae and EOM are normal. Pupils are equal, round, and reactive to light. No scleral icterus. Neck: Normal range of motion. Neck supple. No JVD present. No tracheal deviation present. No thyromegaly present. Cardiovascular: Normal rate, regular rhythm and normal heart sounds.  Exam reveals no gallop and no friction rub.     Pulmonary/Chest: Effort normal, breath sounds coarse. No stridor. Bilateral rhonchi, wet rales right base. Abdominal: Soft. Bowel sounds are normal. No distension and no mass. There is no tenderness. There is no rebound and no guarding. Musculoskeletal: Normal range of motion. There is no edema or tenderness. Neurological: Alert and oriented to person, place, and time. No cranial nerve deficit. Skin: Skin is warm and dry. No rash noted. Not diaphoretic. No erythema. No pallor.      Medications:      cefTRIAXone (ROCEPHIN) IV  1 g Intravenous Q24H    doxycycline (VIBRAMYCIN) IV  100 mg Intravenous Q12H    sodium chloride flush  10 mL Intravenous 2 times per day    enoxaparin  40 mg Subcutaneous Q24H    ipratropium-albuterol  1 ampule Inhalation Q4H WA    methylPREDNISolone  40 mg Intravenous Daily    dorzolamide  2 drop Both Eyes TID    latanoprost  1 drop Both Eyes BID    budesonide  0.25 mg Nebulization BID     sodium chloride flush, magnesium hydroxide, ondansetron, acetaminophen  DIET FULL LIQUID; Nectar Thick     DVT Prophylaxis: Lovenox    Lab and other Data:     Recent Labs     04/02/19  0640 04/03/19  0300   WBC 9.2 22.8*   HGB 13.8 13.3    186     Recent Labs     04/02/19  0640 04/02/19  0643 04/03/19  0300     --  144   K 3.9 3.8 4.2     --  105   CO2 28  --  24   BUN 19  --  25*   CREATININE 0.8  --  0.8   GLUCOSE 140*  --  171*     Recent Labs     04/02/19  0640   AST 16   ALT 12   BILITOT 0.4   ALKPHOS 107*     Troponin T:   Recent Labs     04/02/19  0640   TROPONINI <0.01     Pro-BNP: No results for input(s): BNP in the last 72 hours. INR:   Recent Labs     04/02/19  0640   INR 0.94     ABGs:   Lab Results   Component Value Date    PHART 7.430 04/02/2019    PO2ART 45.0 04/02/2019    PDK4LLX 43.0 04/02/2019     UA:No results for input(s): NITRITE, COLORU, PHUR, LABCAST, WBCUA, RBCUA, MUCUS, TRICHOMONAS, YEAST, BACTERIA, CLARITYU, SPECGRAV, LEUKOCYTESUR, UROBILINOGEN, BILIRUBINUR, BLOODU, GLUCOSEU, AMORPHOUS in the last 72 hours. Invalid input(s): Bhumi Romo    RAD: Noted and reviewed    Micro: Pending    Patient Active Problem List    Diagnosis Date Noted    Palliative care patient 04/03/2019    Hypoxemia 04/02/2019    Leg pain 04/02/2019    Acute hypoxemic respiratory failure ([facility] 04/02/2019    COPD exacerbation ([facility] [address] 04/02/2019    Essential hypertension 04/02/2019       Assessment/plan:   Principal Problem:    Acute hypoxemic respiratory failure (HCC)  Active Problems:    Leg pain    COPD exacerbation ([facility] [address]    Essential hypertension    Palliative care patient  Resolved Problems:    * No resolved hospital problems. *      Plan:     1. Continue care, see above and orders. 2. Add Rocephin  3. Continue Solumedrol/Bronchodilators  4. Prognosis guarded  5. Disposition continue inpatient care  6.  Discharge disposition: SNF resident      Florentino Mcgee MD  Hospitalist Service  4/3/2019  4:11 PM

## 2019-04-03 NOTE — PROGRESS NOTES
Palliative Care: Met with pt and daughter to initiate Palliative Care, pt says she was having trouble breathing. Past Medical History:        Past Medical History:   Diagnosis Date    Anemia     Arthritis     Back pain     [facility] obstructive [facility] disease) [facility] [address]     Glaucoma     Hypertension     Lumbar disc disease     Movement disorder     Palliative care patient 04/03/2019    Pneumonia     Popliteal nerve injury     Reflux esophagitis        Advance Directives:    Daughter says pt has a DPOA; spoke to [employer] requested copy from [employer]. Pain/Other Symptoms:    Pt was having pain and received Tylenol, says she is not having pain today. Activity:    Pt to return to activity as tolerated. Psychological/Spiritual:      Pt says she is [employer].       Patient/Family Discussion:      Pt has one daughter, and one sister. Plan:    Plans to return to [place]. Patients goal, what they hope for:    Goal is to get better. Provided spiritual care with sustaining presence, nurtured hope, and prayer. Pt expressed gratitude for spiritual care.          Electronically signed by Conrado Durbin on 4/3/2019 at 2:11 PM

## 2019-04-03 NOTE — PROGRESS NOTES
Speech Language Pathology  Facility/Department: [facility] ONCOLOGY UNIT  SWALLOW THERAPY     NAME: Robyn Thakkar  : 1924  MRN: [id number]    ADMISSION DATE: 2019  ADMITTING DIAGNOSIS: has Hypoxemia; Leg pain; Acute hypoxemic respiratory failure (HCC); COPD exacerbation ([address] Essential hypertension; and Palliative care patient on their problem list.    Date of Treat: 4/3/2019  Evaluating Therapist: Dorian Chung    Current Diet level:  Current Liquid Diet : Full(With nectar thick)    Reason for Referral  Robyn Thakkar was referred for a bedside swallow evaluation to assess the efficiency of her swallow function, identify signs and symptoms of aspiration and make recommendations regarding safe dietary consistencies, effective compensatory strategies, and safe eating environment. Impression  Observed patient's swallowing function. Patient exhibits slow, decreased oral prep of more solid consistencies, fast oral transit and suspected swallow delay with thin liquids, and sluggish, mild-moderately decreased laryngeal elevation for swallow airway protection. No outward S/S penetration/aspiration was noted with any puree consistency presentation, nectar thick liquid presentation, or thin H2O trial administered during treatment session this date. Patient did exhibit S/S penetration/aspiration with regular solid consistency trials with delayed coughs observed post swallows. At this time, would recommend continuation current diet. Meds crushed in pudding or applesauce. TOTAL FEED. If patient receives mouth care prior to intake, okay for ice chips and thin H2O IN BETWEEN MEALS for comfort. Treatment Plan  Requires SLP Intervention: Yes    Recommended Diet and Intervention  Liquid Consistency Recommendation: Full(With nectar thick)  Recommended Form of Meds: Crushed in puree as able  Therapeutic Interventions: Patient/Family education;Diet tolerance monitoring; Therapeutic PO trials with SLP    Compensatory Swallowing Strategies  Compensatory Swallowing Strategies: Upright as possible for all oral intake; Total feed;Small bites/sips;Eat/Feed slowly; Alternate solids and liquids; Remain upright for 30-45 minutes after meals    Treatment/Goals  Timeframe for Short-term Goals: 1x/day for 3 days   Goal 1: Patient will tolerate full liquid diet with nectar thick liquids with min S/S penetration/aspiration during PO intake. Goal 2: Patient staff will follow swallow safety recommendations to decrease risk of penetration/aspiration during PO intake. Goal 3: Re-assessment of swallow function for potential diet upgrade. General  Chart Reviewed: Yes  Behavior/Cognition: Alert; Cooperative;Pleasant mood  O2 Device: Nasal cannula  Follows Directions: Simple  Patient Positioning: Upright in bed  Volitional Cough: Weak  Volitional Swallow: Delayed  Consistencies Administered: Puree;Reg solid; Nectar - cup; Thin - cup    Observed patient's swallowing function with the following observations noted:    Oral Phase Dysfunction  Oral Phase: Exceptions  Oral Phase Dysfunction  Impaired Mastication: Reg Solid(Patient exhibited slow oral prep with primarily decreased vertical jaw movement noted during regular solid consistency trials presented by SLP. )  Decreased Anterior to Posterior Transit: Reg solid  Suspected Premature Bolus Loss: Thin - cup(Patient exhibited fast oral transit of thin H2O trials presented via cup by SLP. )  Lingual/Palatal Residue: Reg solid(Min-moderate oral cavity residue was noted post swallows; residue was slow but cleared with additional dry swallows. )  Oral Phase - Comment: Oral transit of puree consistency presentations, administered by SLP, primarily measured 1-2 seconds in length and no oral cavity residue was noted post swallows. Oral transit of regular solid consistency trials primarily measured 1-2 seconds in length.  Oral transit of nectar thick liquid presentations, administered via cup by SLP, primarily measured 1-2 seconds in length. Indicators of Pharyngeal Phase Dysfunction  Pharyngeal Phase: Exceptions  Indicators of Pharyngeal Phase Dysfunction  Delayed Swallow: Thin - cup(Suspect secondary to oral transit times.)  Decreased Laryngeal Elevation: (Laryngeal elevation was considered to be sluggish and mild-moderately decreased for swallow airway protection.)  Cough - Delayed: Reg Solid(Delayed coughs were noted following regular solid consistency trials.)  Pharyngeal: No outward S/S penetration/aspiration was noted with any puree consistency presentation or nectar thick liquid presentation. Despite exhibiting fast oral transit and suspected swallow delay, no outward S/S penetration/aspiration as observed with any thin H2O trial. As previously mentioned, patient exhibited S/S penetration/aspiration with regular solid consistency trials with delayed coughs noted post swallows. At this time, would recommend continuation current diet. Meds crushed in pudding or applesauce. TOTAL FEED. If patient receives mouth care prior to intake, okay for ice chips and thin H2O IN BETWEEN MEALS for comfort.      Electronically signed by SEVEN Vela on 4/3/2019 at 2:18 PM

## 2019-04-04 PROCEDURE — 94640 AIRWAY INHALATION TREATMENT: CPT

## 2019-04-04 PROCEDURE — 99232 SBSQ HOSP IP/OBS MODERATE 35: CPT | Performed by: FAMILY MEDICINE

## 2019-04-04 PROCEDURE — 6370000000 HC RX 637 (ALT 250 FOR IP): Performed by: FAMILY MEDICINE

## 2019-04-04 PROCEDURE — 94762 N-INVAS EAR/PLS OXIMTRY CONT: CPT

## 2019-04-04 PROCEDURE — 94664 DEMO&/EVAL PT USE INHALER: CPT

## 2019-04-04 PROCEDURE — 1210000000 HC MED SURG R&B

## 2019-04-04 PROCEDURE — 2500000003 HC RX 250 WO HCPCS: Performed by: EMERGENCY MEDICINE

## 2019-04-04 PROCEDURE — 2580000003 HC RX 258: Performed by: FAMILY MEDICINE

## 2019-04-04 PROCEDURE — 6360000002 HC RX W HCPCS: Performed by: FAMILY MEDICINE

## 2019-04-04 PROCEDURE — 92526 ORAL FUNCTION THERAPY: CPT

## 2019-04-04 PROCEDURE — 2700000000 HC OXYGEN THERAPY PER DAY

## 2019-04-04 PROCEDURE — 2580000003 HC RX 258: Performed by: EMERGENCY MEDICINE

## 2019-04-04 RX ORDER — GUAIFENESIN 600 MG/1
600 TABLET, EXTENDED RELEASE ORAL 2 TIMES DAILY
Status: DISCONTINUED | OUTPATIENT
Start: 2019-04-04 | End: 2019-04-07 | Stop reason: HOSPADM

## 2019-04-04 RX ADMIN — BUDESONIDE 250 MCG: 0.25 SUSPENSION RESPIRATORY (INHALATION) at 06:32

## 2019-04-04 RX ADMIN — IPRATROPIUM BROMIDE AND ALBUTEROL SULFATE 1 AMPULE: .5; 3 SOLUTION RESPIRATORY (INHALATION) at 14:33

## 2019-04-04 RX ADMIN — GUAIFENESIN 600 MG: 600 TABLET, EXTENDED RELEASE ORAL at 15:38

## 2019-04-04 RX ADMIN — DORZOLAMIDE HYDROCHLORIDE 2 DROP: 20 SOLUTION/ DROPS OPHTHALMIC at 09:39

## 2019-04-04 RX ADMIN — DOXYCYCLINE 100 MG: 100 INJECTION, POWDER, LYOPHILIZED, FOR SOLUTION INTRAVENOUS at 21:32

## 2019-04-04 RX ADMIN — IPRATROPIUM BROMIDE AND ALBUTEROL SULFATE 1 AMPULE: .5; 3 SOLUTION RESPIRATORY (INHALATION) at 06:32

## 2019-04-04 RX ADMIN — DORZOLAMIDE HYDROCHLORIDE 2 DROP: 20 SOLUTION/ DROPS OPHTHALMIC at 15:35

## 2019-04-04 RX ADMIN — CEFTRIAXONE 1 G: 1 INJECTION, POWDER, FOR SOLUTION INTRAMUSCULAR; INTRAVENOUS at 16:50

## 2019-04-04 RX ADMIN — METHYLPREDNISOLONE SODIUM SUCCINATE 40 MG: 40 INJECTION, POWDER, FOR SOLUTION INTRAMUSCULAR; INTRAVENOUS at 09:39

## 2019-04-04 RX ADMIN — LATANOPROST 1 DROP: 50 SOLUTION OPHTHALMIC at 09:39

## 2019-04-04 RX ADMIN — IPRATROPIUM BROMIDE AND ALBUTEROL SULFATE 1 AMPULE: .5; 3 SOLUTION RESPIRATORY (INHALATION) at 11:00

## 2019-04-04 RX ADMIN — Medication 10 ML: at 09:39

## 2019-04-04 RX ADMIN — IPRATROPIUM BROMIDE AND ALBUTEROL SULFATE 1 AMPULE: .5; 3 SOLUTION RESPIRATORY (INHALATION) at 19:37

## 2019-04-04 RX ADMIN — DOXYCYCLINE 100 MG: 100 INJECTION, POWDER, LYOPHILIZED, FOR SOLUTION INTRAVENOUS at 09:39

## 2019-04-04 RX ADMIN — GUAIFENESIN 600 MG: 600 TABLET, EXTENDED RELEASE ORAL at 20:04

## 2019-04-04 RX ADMIN — ACETAMINOPHEN 650 MG: 325 TABLET, FILM COATED ORAL at 03:28

## 2019-04-04 RX ADMIN — ENOXAPARIN SODIUM 40 MG: 100 INJECTION SUBCUTANEOUS at 15:34

## 2019-04-04 RX ADMIN — ONDANSETRON 4 MG: 2 INJECTION INTRAMUSCULAR; INTRAVENOUS at 16:55

## 2019-04-04 RX ADMIN — LATANOPROST 1 DROP: 50 SOLUTION OPHTHALMIC at 17:50

## 2019-04-04 RX ADMIN — ACETAMINOPHEN 650 MG: 325 TABLET, FILM COATED ORAL at 11:50

## 2019-04-04 RX ADMIN — Medication 10 ML: at 20:04

## 2019-04-04 RX ADMIN — DORZOLAMIDE HYDROCHLORIDE 2 DROP: 20 SOLUTION/ DROPS OPHTHALMIC at 20:04

## 2019-04-04 ASSESSMENT — PAIN SCALES - GENERAL
PAINLEVEL_OUTOF10: 2
PAINLEVEL_OUTOF10: 4
PAINLEVEL_OUTOF10: 4
PAINLEVEL_OUTOF10: 0

## 2019-04-04 NOTE — PLAN OF CARE
Problem: Falls - Risk of:  Goal: Will remain free from falls  Description  Will remain free from falls  4/4/2019 0413 by Monico Rhodes RN  Outcome: Ongoing  4/3/2019 1626 by Marion Gomez RN  Outcome: Ongoing  Goal: Absence of physical injury  Description  Absence of physical injury  4/4/2019 0413 by Monico Rhodes RN  Outcome: Ongoing  4/3/2019 1626 by Marion Gomez RN  Outcome: Ongoing     Problem: Pain:  Description  Pain management should include both nonpharmacologic and pharmacologic interventions. Goal: Pain level will decrease  Description  Pain level will decrease  4/4/2019 0413 by Monico Rhodes RN  Outcome: Ongoing  4/3/2019 1626 by Marion Gomez RN  Outcome: Ongoing  Goal: Control of acute pain  Description  Control of acute pain  4/4/2019 0413 by Monico Rhodes RN  Outcome: Ongoing  4/3/2019 1626 by Marion Gomez RN  Outcome: Ongoing  Goal: Control of chronic pain  Description  Control of chronic pain  4/4/2019 0413 by Monico Rhodes RN  Outcome: Ongoing  4/3/2019 1626 by Marion Gomez RN  Outcome: Ongoing     Problem: Risk for Impaired Skin Integrity  Goal: Tissue integrity - skin and mucous membranes  Description  Structural intactness and normal physiological function of skin and  mucous membranes.   4/4/2019 0413 by Monico Rhodes RN  Outcome: Ongoing  4/3/2019 1626 by [place] Gomez RN  Outcome: Ongoing     Problem: Breathing Pattern - Ineffective:  Goal: Ability to achieve and maintain a regular respiratory rate will improve  Description  Ability to achieve and maintain a regular respiratory rate will improve  4/4/2019 0413 by Monico Rhodes RN  Outcome: Ongoing  4/3/2019 1626 by [place] Gomez RN  Outcome: Ongoing

## 2019-04-04 NOTE — PLAN OF CARE
JOSHUA Montgomery  Outcome: Ongoing  4/4/2019 0413 by Maxime Benton RN  Outcome: Ongoing  4/3/2019 1626 by Jim Medeiros RN  Outcome: Ongoing

## 2019-04-04 NOTE — CARE COORDINATION
[address] Transitions Interview     2019    Patient: Lora Valencia Patient : 1924   MRN: [id number]  Reason for Admission: Hypoxemia  RARS: Readmission Risk Score: 12         Spoke with: Lora Valencia and Aan, patient's daughter      Readmission Risk  Patient Active Problem List   Diagnosis    Hypoxemia    Leg pain    Acute hypoxemic respiratory failure ([address]    [facility] exacerbation [facility]    Essential hypertension    Palliative care patient       Inpatient Assessment  Care Transitions Summary    Care Transitions Inpatient Review  Medication Review  Are you able to afford your medications?:  Yes  How often do you have difficulty taking your medications?:  I always take them as prescribed. Housing Review  Who do you live with?:  Alone  Are you an active caregiver in your home?:  No  Social Support  Do you have a ?:  No  Do you have a [facility]?:  No  Durable Medical Equipment  Patient DME:  Straight cane, Walker, Wheelchair  Functional Review  Ability to seek help/take action for Emergent/Urgent situations i.e. fire, crime, inclement weather or health crisis.:  Needs Assistance  Ability handle personal hygiene needs (bathing/dressing/grooming):  Needs Assistance  Ability to manage medications:  Dependent  Ability to prepare food:  Dependent  Ability to maintain home (clean home, laundry):  Dependent  Ability to drive and/or has transportation:  Dependent  Ability to do shopping:  Dependent  Ability to manage finances:  Dependent  Is patient able to live independently?:  No  Hearing and Vision  Visual Impairment:  Visual impairment (Glasses/contacts), Blind  Hearing Impairment:  Hard of hearing  Care Transitions Interventions         Follow Up : Met at bedside with Ms. Lily Braden, discussed BPCI-A process and presented the CMS Beneficiary Letter. Contact information given. Dat Drake is agreeable with appropriate follow up after discharge.   Patient is a current resident of [facility]. Daughter is in room during interview. Patient has a walker, cane, and wheelchair at the facility. 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 She is dependant for meds, meals, finances. Will follow as inpatient and at discharge. No future appointments. Health Maintenance  There are no preventive care reminders to display for this patient.     Courtney Giron RN

## 2019-04-04 NOTE — CARE COORDINATION
SW obtained POA paperwork from [place] and added to pt's soft chart. Pt's daughter, Viktoriya Lord is POA. SW notified charge nurse and Mayi Shah. SW will continue to follow and assist as needed.     Electronically signed by Cash Aguillon on 4/4/2019 at 12:00 PM

## 2019-04-04 NOTE — PROGRESS NOTES
[facility]      Patient:  Keith Araujo  YOB: 1924  Date of Service: 4/4/2019  MRN: [id number]   Acct: [de-identified]   Primary Care Physician: Garcia Humphrey  Advance Directive: DNR-CC  Admit Date: 4/2/2019       Hospital Day: 2    CHIEF COMPLAINT Shortness of breath    Subjective:  Congested, appears debilitated. Objective:   VITALS:  BP (!) 141/77   Pulse 108   Temp 100.1 °F (37.8 °C) (Temporal)   Resp 20   Ht 5' 5\" (1.651 m)   Wt 107 lb (48.5 kg)   SpO2 95%   BMI 17.81 kg/m²   24HR INTAKE/OUTPUT:      Intake/Output Summary (Last 24 hours) at 4/4/2019 1427  Last data filed at 4/4/2019 1046  Gross per 24 hour   Intake 360 ml   Output --   Net 360 ml     Constitutional: Oriented to person, place, and time. Well-developed and well-nourished. Dyspneic at rest.   HENT:  Head: Normocephalic and atraumatic.    Mouth/Throat: Oropharynx is clear and moist. No oropharyngeal exudate. Eyes: Conjunctivae and EOM are normal. Pupils are equal, round, and reactive to light. No scleral icterus. Neck: Normal range of motion. Neck supple. No JVD present. No tracheal deviation present. No thyromegaly present. Cardiovascular: Normal rate, regular rhythm and normal heart sounds.  Exam reveals no gallop and no friction rub.     Pulmonary/Chest: Effort normal, breath sounds coarse. No stridor. Bilateral  Wet rales. Abdominal: Soft. Bowel sounds are normal. No distension and no mass. There is no tenderness. There is no rebound and no guarding. Musculoskeletal: Normal range of motion. There is no edema or tenderness. Neurological: Alert and oriented to person, place, and time. No cranial nerve deficit. Skin: Skin is warm and dry. No rash noted. Not diaphoretic. No erythema. No pallor.      Medications:      cefTRIAXone (ROCEPHIN) IV  1 g Intravenous Q24H    doxycycline (VIBRAMYCIN) IV  100 mg Intravenous Q12H    sodium chloride flush  10 mL Intravenous 2 times per day    enoxaparin  40 mg Subcutaneous Q24H    ipratropium-albuterol  1 ampule Inhalation Q4H WA    methylPREDNISolone  40 mg Intravenous Daily    dorzolamide  2 drop Both Eyes TID    latanoprost  1 drop Both Eyes BID    budesonide  0.25 mg Nebulization BID     sodium chloride flush, magnesium hydroxide, ondansetron, acetaminophen  DIET FULL LIQUID; Nectar Thick     DVT Prophylaxis: Lovenox    Lab and other Data:     Recent Labs     04/02/19  0640 04/03/19  0300   WBC 9.2 22.8*   HGB 13.8 13.3    186     Recent Labs     04/02/19  0640 04/02/19  0643 04/03/19  0300     --  144   K 3.9 3.8 4.2     --  105   CO2 28  --  24   BUN 19  --  25*   CREATININE 0.8  --  0.8   GLUCOSE 140*  --  171*     Recent Labs     04/02/19  0640   AST 16   ALT 12   BILITOT 0.4   ALKPHOS 107*     Troponin T:   Recent Labs     04/02/19  0640   TROPONINI <0.01     Pro-BNP: No results for input(s): BNP in the last 72 hours. INR:   Recent Labs     04/02/19  0640   INR 0.94     ABGs:   Lab Results   Component Value Date    PHART 7.430 04/02/2019    PO2ART 45.0 04/02/2019    IRX3CBF 43.0 04/02/2019     UA:No results for input(s): NITRITE, COLORU, PHUR, LABCAST, WBCUA, RBCUA, MUCUS, TRICHOMONAS, YEAST, BACTERIA, CLARITYU, SPECGRAV, LEUKOCYTESUR, UROBILINOGEN, BILIRUBINUR, BLOODU, GLUCOSEU, AMORPHOUS in the last 72 hours. Invalid input(s): Forrestine Oddi    RAD: Noted and reviewed    Micro: Pending    Patient Active Problem List    Diagnosis Date Noted    Palliative care patient 04/03/2019    Hypoxemia 04/02/2019    Leg pain 04/02/2019    Acute hypoxemic respiratory failure ([facility] [address] 04/02/2019    COPD exacerbation ([facility] [address] 04/02/2019    Essential hypertension 04/02/2019       Assessment/plan:   Principal Problem:    Acute hypoxemic respiratory failure (HCC)  Active Problems:    Leg pain    COPD exacerbation ([facility] [address]    Essential hypertension    Palliative care patient  Resolved Problems:    * No resolved hospital problems. *      Plan:     1.  Continue care, see above and orders. 2. Add Rocephin  3. Add Mucinex  4. Continue Solumedrol/Bronchodilators  5. Prognosis guarded  6. Disposition continue inpatient care  7.  Discharge disposition: SNF resident      Delfino Beckham MD  Hospitalist Service  4/4/2019  2:27 PM

## 2019-04-04 NOTE — PROGRESS NOTES
Speech Language Pathology  Facility/Department: [facility] ONCOLOGY UNIT  SWALLOW THERAPY     NAME: Jillian Garcia  : 1924  MRN: [id number]    ADMISSION DATE: 2019  ADMITTING DIAGNOSIS: has Hypoxemia; Leg pain; Acute hypoxemic respiratory failure (HCC); COPD exacerbation ([address] Essential hypertension; and Palliative care patient on their problem list.    Date of Treat: 2019  Evaluating Therapist: Charleen Ormond    Current Diet level:  Current Liquid Diet : Full(With nectar thick)    Primary Complaint  Patient Complaint: Patient requesting better foods. Reason for Referral  Jillian Garcia was referred for a bedside swallow evaluation to assess the efficiency of her swallow function, identify signs and symptoms of aspiration and make recommendations regarding safe dietary consistencies, effective compensatory strategies, and safe eating environment. Impression  Observed patient's swallowing function. Patient exhibits slow, decreased oral prep and decreased oral transit of more solid consistencies (mechanical soft consistency residue did clear with additional dry swallows) as well as sluggish, moderately decreased laryngeal elevation for swallow airway protection. Even so, no outward S/S penetration/aspiration was noted with any mechanical soft consistency trial or nectar thick liquid presentation administered during treatment session this date. At this time, per patient request, would trial mechanical soft consistency. Continue nectar thick liquids. Meds crushed in pudding or applesauce. TOTAL FEED. If patient receives mouth care prior to intake, okay for ice chips IN BETWEEN MEALS for comfort.     Treatment Plan  Requires SLP Intervention: Yes     Recommended Diet and Intervention  Diet Solids Recommendation: Dysphagia II Mechanically Altered  Liquid Consistency Recommendation: Nectar  Recommended Form of Meds: Crushed in puree as able  Therapeutic Interventions: Patient/Family education;Diet tolerance monitoring    Compensatory Swallowing Strategies  Compensatory Swallowing Strategies: Upright as possible for all oral intake; Total feed;Small bites/sips;Eat/Feed slowly; Alternate solids and liquids; Remain upright for 30-45 minutes after meals    Treatment/Goals  Timeframe for Short-term Goals: 1x/day for 3 days   Goal 1: Patient will tolerate mechanical  soft consistency and nectar thick liquids with min S/S penetration/aspiration during PO intake. Goal 2: Patient staff will follow swallow safety recommendations to decrease risk of penetration/aspiration during PO intake. General  Chart Reviewed: Yes  Behavior/Cognition: Alert; Cooperative;Pleasant mood  O2 Device: Nasal cannula  Communication Observation: (Weak voicing noted during verbalizations. )  Patient Positioning: Upright in bed  Consistencies Administered: Mechanical soft; Nectar - straw    Observed patient's swallowing function with the following observations noted:    Oral Phase Dysfunction  Oral Phase: Exceptions  Oral Phase Dysfunction  Impaired Mastication: Mechanical soft(Patient exhibited slow oral prep with primarily decreased vertical jaw movement noted during mechanical soft consistency trials presented by SLP. )  Decreased Anterior to Posterior Transit: Mechanical soft  Lingual/Palatal Residue: Mechanical soft (Min-moderate oral cavity residue was noted post swallows; residue was slow but cleared with additional dry swallows. )  Oral Phase - Comment: Oral transit of mechanical soft consistency trials primarily measured 1-2 seconds in length. Oral transit of nectar thick liquid  presentations, administered via straw by SLP, primarily measured 1-2 seconds in length.       Indicators of Pharyngeal Phase Dysfunction  Pharyngeal Phase: Exceptions  Indicators of Pharyngeal Phase Dysfunction  Decreased Laryngeal Elevation: (Patient exhibited sluggish, moderately decreased laryngeal elevation for swallow airway protection.)  Pharyngeal: No outward S/S penetration/aspiration was noted with any mechanical soft consistency trial or nectar thick liquid presentation administered during treatment session this date. At this time, per patient request, would trial mechanical soft consistency. Continue nectar thick liquids. Meds crushed in pudding or applesauce. TOTAL FEED. If patient receives mouth care prior to intake, okay for ice chips IN BETWEEN MEALS for comfort.      Electronically signed by SEVEN Berrios on 4/4/2019 at 3:11 PM

## 2019-04-05 ENCOUNTER — APPOINTMENT (OUTPATIENT)
Dept: GENERAL RADIOLOGY | Age: 84
DRG: 190 | End: 2019-04-05
Payer: MEDICARE

## 2019-04-05 LAB
ANION GAP SERPL CALCULATED.3IONS-SCNC: 11 MMOL/L (ref 7–19)
BASOPHILS ABSOLUTE: 0 K/UL (ref 0–0.2)
BASOPHILS RELATIVE PERCENT: 0.1 % (ref 0–1)
BUN BLDV-MCNC: 28 MG/DL (ref 8–23)
CALCIUM SERPL-MCNC: 9.5 MG/DL (ref 8.2–9.6)
CHLORIDE BLD-SCNC: 104 MMOL/L (ref 98–111)
CO2: 28 MMOL/L (ref 22–29)
CREAT SERPL-MCNC: 0.6 MG/DL (ref 0.5–0.9)
EOSINOPHILS ABSOLUTE: 0 K/UL (ref 0–0.6)
EOSINOPHILS RELATIVE PERCENT: 0.1 % (ref 0–5)
GFR NON-AFRICAN AMERICAN: >60
GLUCOSE BLD-MCNC: 106 MG/DL (ref 74–109)
HCT VFR BLD CALC: 41.5 % (ref 37–47)
HEMOGLOBIN: 12.8 G/DL (ref 12–16)
LYMPHOCYTES ABSOLUTE: 1.1 K/UL (ref 1.1–4.5)
LYMPHOCYTES RELATIVE PERCENT: 12.8 % (ref 20–40)
MCH RBC QN AUTO: 27.3 PG (ref 27–31)
MCHC RBC AUTO-ENTMCNC: 30.8 G/DL (ref 33–37)
MCV RBC AUTO: 88.5 FL (ref 81–99)
MONOCYTES ABSOLUTE: 1 K/UL (ref 0–0.9)
MONOCYTES RELATIVE PERCENT: 12 % (ref 0–10)
NEUTROPHILS ABSOLUTE: 6.2 K/UL (ref 1.5–7.5)
NEUTROPHILS RELATIVE PERCENT: 74.5 % (ref 50–65)
PDW BLD-RTO: 14.6 % (ref 11.5–14.5)
PLATELET # BLD: 168 K/UL (ref 130–400)
PMV BLD AUTO: 11.7 FL (ref 9.4–12.3)
POTASSIUM SERPL-SCNC: 4.1 MMOL/L (ref 3.5–5)
RBC # BLD: 4.69 M/UL (ref 4.2–5.4)
SODIUM BLD-SCNC: 143 MMOL/L (ref 136–145)
WBC # BLD: 8.3 K/UL (ref 4.8–10.8)

## 2019-04-05 PROCEDURE — 2580000003 HC RX 258: Performed by: EMERGENCY MEDICINE

## 2019-04-05 PROCEDURE — 2500000003 HC RX 250 WO HCPCS: Performed by: EMERGENCY MEDICINE

## 2019-04-05 PROCEDURE — 6360000002 HC RX W HCPCS: Performed by: FAMILY MEDICINE

## 2019-04-05 PROCEDURE — 1210000000 HC MED SURG R&B

## 2019-04-05 PROCEDURE — 99222 1ST HOSP IP/OBS MODERATE 55: CPT | Performed by: NURSE PRACTITIONER

## 2019-04-05 PROCEDURE — 92526 ORAL FUNCTION THERAPY: CPT

## 2019-04-05 PROCEDURE — 6370000000 HC RX 637 (ALT 250 FOR IP): Performed by: FAMILY MEDICINE

## 2019-04-05 PROCEDURE — 36415 COLL VENOUS BLD VENIPUNCTURE: CPT

## 2019-04-05 PROCEDURE — 94762 N-INVAS EAR/PLS OXIMTRY CONT: CPT

## 2019-04-05 PROCEDURE — 85025 COMPLETE CBC W/AUTO DIFF WBC: CPT

## 2019-04-05 PROCEDURE — 2580000003 HC RX 258: Performed by: FAMILY MEDICINE

## 2019-04-05 PROCEDURE — 94640 AIRWAY INHALATION TREATMENT: CPT

## 2019-04-05 PROCEDURE — 71046 X-RAY EXAM CHEST 2 VIEWS: CPT

## 2019-04-05 PROCEDURE — 2700000000 HC OXYGEN THERAPY PER DAY

## 2019-04-05 PROCEDURE — 99232 SBSQ HOSP IP/OBS MODERATE 35: CPT | Performed by: FAMILY MEDICINE

## 2019-04-05 PROCEDURE — 80048 BASIC METABOLIC PNL TOTAL CA: CPT

## 2019-04-05 RX ADMIN — IPRATROPIUM BROMIDE AND ALBUTEROL SULFATE 1 AMPULE: .5; 3 SOLUTION RESPIRATORY (INHALATION) at 18:55

## 2019-04-05 RX ADMIN — DOXYCYCLINE 100 MG: 100 INJECTION, POWDER, LYOPHILIZED, FOR SOLUTION INTRAVENOUS at 08:17

## 2019-04-05 RX ADMIN — GUAIFENESIN 600 MG: 600 TABLET, EXTENDED RELEASE ORAL at 08:22

## 2019-04-05 RX ADMIN — DORZOLAMIDE HYDROCHLORIDE 2 DROP: 20 SOLUTION/ DROPS OPHTHALMIC at 08:22

## 2019-04-05 RX ADMIN — BUDESONIDE 250 MCG: 0.25 SUSPENSION RESPIRATORY (INHALATION) at 18:55

## 2019-04-05 RX ADMIN — IPRATROPIUM BROMIDE AND ALBUTEROL SULFATE 1 AMPULE: .5; 3 SOLUTION RESPIRATORY (INHALATION) at 11:27

## 2019-04-05 RX ADMIN — DORZOLAMIDE HYDROCHLORIDE 2 DROP: 20 SOLUTION/ DROPS OPHTHALMIC at 14:06

## 2019-04-05 RX ADMIN — LATANOPROST 1 DROP: 50 SOLUTION OPHTHALMIC at 08:22

## 2019-04-05 RX ADMIN — Medication 10 ML: at 08:25

## 2019-04-05 RX ADMIN — GUAIFENESIN 600 MG: 600 TABLET, EXTENDED RELEASE ORAL at 21:36

## 2019-04-05 RX ADMIN — METHYLPREDNISOLONE SODIUM SUCCINATE 40 MG: 40 INJECTION, POWDER, FOR SOLUTION INTRAMUSCULAR; INTRAVENOUS at 08:22

## 2019-04-05 RX ADMIN — IPRATROPIUM BROMIDE AND ALBUTEROL SULFATE 1 AMPULE: .5; 3 SOLUTION RESPIRATORY (INHALATION) at 14:59

## 2019-04-05 RX ADMIN — BUDESONIDE 250 MCG: 0.25 SUSPENSION RESPIRATORY (INHALATION) at 07:23

## 2019-04-05 RX ADMIN — LATANOPROST 1 DROP: 50 SOLUTION OPHTHALMIC at 17:44

## 2019-04-05 RX ADMIN — CEFTRIAXONE 1 G: 1 INJECTION, POWDER, FOR SOLUTION INTRAMUSCULAR; INTRAVENOUS at 17:14

## 2019-04-05 RX ADMIN — ENOXAPARIN SODIUM 40 MG: 100 INJECTION SUBCUTANEOUS at 14:05

## 2019-04-05 RX ADMIN — DORZOLAMIDE HYDROCHLORIDE 2 DROP: 20 SOLUTION/ DROPS OPHTHALMIC at 21:37

## 2019-04-05 RX ADMIN — IPRATROPIUM BROMIDE AND ALBUTEROL SULFATE 1 AMPULE: .5; 3 SOLUTION RESPIRATORY (INHALATION) at 07:22

## 2019-04-05 RX ADMIN — DOXYCYCLINE 100 MG: 100 INJECTION, POWDER, LYOPHILIZED, FOR SOLUTION INTRAVENOUS at 21:37

## 2019-04-05 ASSESSMENT — PAIN SCALES - GENERAL: PAINLEVEL_OUTOF10: 0

## 2019-04-05 ASSESSMENT — ENCOUNTER SYMPTOMS
ABDOMINAL PAIN: 0
COUGH: 1
STRIDOR: 0
SHORTNESS OF BREATH: 1
CHEST TIGHTNESS: 0
EYES NEGATIVE: 1
WHEEZING: 0
ABDOMINAL DISTENTION: 0

## 2019-04-05 NOTE — CONSULTS
Consults     Palliative Care Consult Note  4/5/2019 10:36 AM  Subjective:   Admit Date: 4/2/2019  PCP: Bakari Agee    Reason For Consult: Goals of Care    Requesting Physician: Dr. Keith Gonzalez    History Obtained From: Nursing, EMR, Patient, Family    Chief Complaint: Shortness of breath    History of Present Illness:  Patient is a 80-year-old female who presented to the ED on 4/02/2019  from [facility] with complaints of shortness of breath that started the previous night. Patient also reported having a cough and dyspnea at rest.  She was noted to be hypoxemic and oxygen was added, CT of the chest was negative for PE and lung disease. She was admitted for further evaluation and treatment. Since admission, patient has been treated with IVF,duonebs, IV Abx, and solumedrol. .  She remains on supplemental oxygen but has been weaned down to 2.5 L nasal cannula. Blood cultures have no growth today, rapid influenza negative, and respiratory virus PCR is pending. She does continue to have some coarse breath sounds and a nonproductive cough. Palliative medicine was consulted to assist with goals of care. Past Medical History:        Diagnosis Date    Anemia     Arthritis     Back pain     COPD [facility]chronic obstructive pulmonary disease) ([facility] [address]     Glaucoma     Hypertension     Lumbar disc disease     Movement disorder     Palliative care patient 04/03/2019    Pneumonia     Popliteal nerve injury     Reflux esophagitis        Past Surgical History:        Procedure Laterality Date    BREAST BIOPSY      EYE SURGERY         Allergies:  Patient has no known allergies. Social History:    TOBACCO:   reports that she has never smoked. She has never used smokeless tobacco.  ETOH:   reports that she does not drink alcohol.   Patient currently lives in a nursing home    Family History:       Problem Relation Age of Onset    Heart Disease Mother     Heart Attack Mother     Prostate Cancer Richa Maynard MD        magnesium hydroxide (MILK OF MAGNESIA) 400 MG/5ML suspension 30 mL  30 mL Oral Daily PRN Richa Maynard MD        ondansetron [facility]) injection 4 mg  4 mg Intravenous Q6H PRN Richa Maynard MD   4 mg at 04/04/19 1655    enoxaparin (LOVENOX) injection 40 mg  40 mg Subcutaneous Q24H Richa Maynard MD   40 mg at 04/04/19 1534    ipratropium-albuterol (DUONEB) nebulizer solution 1 ampule  1 ampule Inhalation Q4H WA Richa Maynard MD   1 ampule at 04/05/19 7311    methylPREDNISolone sodium (SOLU-MEDROL) injection 40 mg  40 mg Intravenous Daily Richa Maynard MD   40 mg at 04/05/19 7030    dorzolamide (TRUSOPT) 2 % ophthalmic solution 2 drop  2 drop Both Eyes TID Richa Maynard MD   2 drop at 04/05/19 1762    latanoprost (XALATAN) 0.005 % ophthalmic solution 1 drop  1 drop Both Eyes BID Richa Maynard MD   1 drop at 04/05/19 6746    budesonide (PULMICORT) nebulizer suspension 250 mcg  0.25 mg Nebulization BID Richa Maynard MD   250 mcg at 04/05/19 3298    acetaminophen (TYLENOL) tablet 650 mg  650 mg Oral Q4H PRN Richa Maynard MD   650 mg at 04/04/19 1150        Labs:     Recent Labs     04/03/19  0300 04/05/19  0416   WBC 22.8* 8.3   RBC 4.79 4.69   HGB 13.3 12.8   HCT 43.1 41.5   MCV 90.0 88.5   MCH 27.8 27.3   MCHC 30.9* 30.8*    168     Recent Labs     04/03/19  0300 04/05/19  0416    143   K 4.2 4.1   ANIONGAP 15 11    104   CO2 24 28   BUN 25* 28*   CREATININE 0.8 0.6   GLUCOSE 171* 106   CALCIUM 10.0* 9.5     No results for input(s): MG, PHOS in the last 72 hours. No results for input(s): AST, ALT, ALB, BILITOT, ALKPHOS, ALB in the last 72 hours. ABGs:No results for input(s): PHART, RYH4BKU, PO2ART, CZK7XHJ, BEART, HGBAE, J6SICEHK, CARBOXHGBART, 02THERAPY in the last 72 hours. HgBA1c: No results for input(s): LABA1C in the last 72 hours.   FLP:  No results found for: TRIG, HDL, LDLCALC, LDLDIRECT, LABVLDL  TSH:    Lab Results Component Value Date    TSH 2.15 06/25/2015     Troponin T: No results for input(s): TROPONINI in the last 72 hours. INR: No results for input(s): INR in the last 72 hours. Objective:   Vitals: BP (!) 153/80   Pulse 100   Temp 98.2 °F (36.8 °C) (Temporal)   Resp 14   Ht 5' 5\" (1.651 m)   Wt 107 lb (48.5 kg)   SpO2 96%   BMI 17.81 kg/m²   24HR INTAKE/OUTPUT:      Intake/Output Summary (Last 24 hours) at 4/5/2019 1036  Last data filed at 4/5/2019 0934  Gross per 24 hour   Intake 510.22 ml   Output --   Net 510.22 ml     Physical Exam    General appearance: alert and cooperative with exam, vital signs stable, elderly, frail  HEENT: atraumatic, eyes with clear conjunctiva and normal lids, pupils and irises normal, external ears and nose are normal,lips normal.  Neck: without masses, lymphadenopathy, bruit, thyroid normal  Lungs: no increased work of breathing, coarse bilaterally, nonproductive cough, supplemental oxygen in place  Heart: regular rate and rhythm, S1, S2 normal and distal pulses intact  Abdomen: soft, non-tender; bowel sounds normal; no masses,  no organomegaly  Genitourinary: No bladder fullness, masses, or tenderness  Extremities: extremities normal, atraumatic, no cyanosis or edema  Neurologic: No focal neurologic deficits, normal sensation, no tremor, alert and oriented  Psychiatric: Alert and oriented, no recent or remote memory deficits, good judgement, mood and affect appropriate  Skin: no rashes,lesions, or nodules. Assessment and Plan:   Principal Problem:    Acute hypoxemic respiratory failure (HCC)  Active Problems:    Leg pain    COPD exacerbation ([facility] [address]    Essential hypertension    Palliative care patient  Resolved Problems:    * No resolved hospital problems. *      Visit Summary: I saw the patient at the bedside with her daughter, who is her POA, present.   Patient is currently resting comfortably in bed and has no complaints, she does endorse some shortness of breath, but

## 2019-04-05 NOTE — PLAN OF CARE
Problem: Falls - Risk of:  Goal: Will remain free from falls  Description  Will remain free from falls  4/5/2019 0009 by Garett Alvarez LPN  Outcome: Ongoing  4/4/2019 1815 by Tonia Barreto RN  Outcome: Ongoing  Goal: Absence of physical injury  Description  Absence of physical injury  4/5/2019 0009 by Garett Alvarez LPN  Outcome: Ongoing  4/4/2019 1815 by Tonia Barreto RN  Outcome: Ongoing     Problem: Pain:  Goal: Pain level will decrease  Description  Pain level will decrease  4/5/2019 0009 by Garett Alvarez LPN  Outcome: Ongoing  4/4/2019 1815 by Tonia Barreto RN  Outcome: Ongoing  Goal: Control of acute pain  Description  Control of acute pain  4/5/2019 0009 by Garett Alvarez LPN  Outcome: Ongoing  4/4/2019 1815 by Tonia Barreto RN  Outcome: Ongoing  Goal: Control of chronic pain  Description  Control of chronic pain  4/5/2019 0009 by Garett Alvarez LPN  Outcome: Ongoing  4/4/2019 1815 by Tonia Barreto RN  Outcome: Ongoing     Problem: Risk for Impaired Skin Integrity  Goal: Tissue integrity - skin and mucous membranes  Description  Structural intactness and normal physiological function of skin and  mucous membranes.   4/5/2019 0009 by Garett Alvarez LPN  Outcome: Ongoing  4/4/2019 1815 by Tonia Barreto RN  Outcome: Ongoing     Problem: Breathing Pattern - Ineffective:  Goal: Ability to achieve and maintain a regular respiratory rate will improve  Description  Ability to achieve and maintain a regular respiratory rate will improve  4/5/2019 0009 by Garett Alvarez LPN  Outcome: Ongoing  4/4/2019 1815 by Tonia Barreto RN  Outcome: Ongoing

## 2019-04-05 NOTE — PROGRESS NOTES
Speech Language Pathology  Facility/Department: [facility] 4 ONCOLOGY UNIT  SWALLOW THERAPY     NAME: Xochitl Pederson  : 1924  MRN: [id number]    ADMISSION DATE: 2019  ADMITTING DIAGNOSIS: has Hypoxemia; Leg pain; Acute hypoxemic respiratory failure (HCC); COPD exacerbation ([address] Essential hypertension; and Palliative care patient on their problem list.    Date of Treat: 2019  Evaluating Therapist: Jana Contreras    Current Diet level:  Current Diet : Mech soft  Current Liquid Diet : Nectar    Reason for Referral  Xochitl Pederson was referred for a bedside swallow evaluation to assess the efficiency of her swallow function, identify signs and symptoms of aspiration and make recommendations regarding safe dietary consistencies, effective compensatory strategies, and safe eating environment. Impression  Observed patient's swallowing function. Patient exhibits slow, decreased oral prep and transit of more solid consistencies (dysphagia III consistency residue cleared with additional dry swallows) as well as very sluggish, mild-moderately decreased laryngeal elevation for swallow airway protection. Even so, no outward S/S penetration/aspiration was noted with any dysphagia III consistency trial or nectar thick liquid presentation administered during treatment session this date. At this time, would trial dysphagia III consistency. Continue nectar thick liquids. Recommend meds crushed in pudding or applesauce. Assist feed. Treatment Plan  Requires SLP Intervention: Yes    Recommended Diet and Intervention  Diet Solids Recommendation: Dysphagia III Advanced  Liquid Consistency Recommendation: Nectar  Recommended Form of Meds: Crushed in puree as able  Therapeutic Interventions: Patient/Family education;Diet tolerance monitoring; Therapeutic PO trials    Compensatory Swallowing Strategies  Compensatory Swallowing Strategies: Upright as possible for all oral intake;Small bites/sips;Eat/Feed slowly; Alternate solids and liquids; Remain upright for 30-45 minutes after meals;Assist feed    Treatment/Goals  Timeframe for Short-term Goals: 1x/day for 3 days   Goal 1: Patient will tolerate dysphagia III consistency and nectar thick liquids with min S/S penetration/aspiration during PO intake. Goal 2: Patient staff will follow swallow safety recommendations to decrease risk of penetration/aspiration during PO intake. Goal 3: Re-assessment of swallow function for potential diet upgrade. General  Chart Reviewed: Yes  Behavior/Cognition: Alert; Cooperative;Pleasant mood  O2 Device: Nasal cannula  Communication Observation: (Weak voicing was noted during verbalizations. )  Follows Directions: Simple  Patient Positioning: Upright in bed  Consistencies Administered: Soft solid; Nectar - straw    Observed patient's swallowing function with the following observations noted:    Oral Phase Dysfunction  Oral Phase: Exceptions  Oral Phase Dysfunction  Impaired Mastication: Soft Solid(Patient exhibited slow oral prep with primarily decreased vertical jaw movement noted during dysphagia III consistency trials presented by SLP. )  Decreased Anterior to Posterior Transit: Soft solid  Lingual/Palatal Residue: Soft solid(Min-moderate oral cavity residue was noted post swallows; residue was slow but cleared with additional dry swallows. )  Oral Phase - Comment: Oral transit of dysphagia III consistency trials primarily measured 1-2 seconds in length. Oral transit of nectar thick liquid  presentations, administered via straw by SLP, primarily measured 1-2 seconds in length.       Indicators of Pharyngeal Phase Dysfunction  Pharyngeal Phase: Exceptions  Indicators of Pharyngeal Phase Dysfunction  Decreased Laryngeal Elevation: (Patient exhibited very sluggish, mild-moderately decreased laryngeal elevation for swallow airway protection.)  Pharyngeal: No outward S/S penetration/aspiration was noted with any dysphagia III consistency trial or nectar thick liquid presentation administered during treatment session this date. At this time, would trial dysphagia III consistency. Continue nectar thick liquids. Recommend meds crushed in pudding or applesauce. Assist feed.      Electronically signed by SEVEN Smiley on 4/5/2019 at 9:57 AM

## 2019-04-05 NOTE — PROGRESS NOTES
[facility]      Patient:  Robyn Thakkar  YOB: 1924  Date of Service: 4/5/2019  MRN: [id number]   Acct: [de-identified]   Primary Care Physician: John Mehta  Advance Directive: DNR-CC  Admit Date: 4/2/2019       Hospital Day: 3    CHIEF COMPLAINT Shortness of breath    Subjective:  Breathing is less labored today, she feels a little better. Objective:   VITALS:  BP (!) 141/75   Pulse 93   Temp 98 °F (36.7 °C) (Temporal)   Resp 20   Ht 5' 5\" (1.651 m)   Wt 107 lb (48.5 kg)   SpO2 95%   BMI 17.81 kg/m²   24HR INTAKE/OUTPUT:      Intake/Output Summary (Last 24 hours) at 4/5/2019 1703  Last data filed at 4/5/2019 1236  Gross per 24 hour   Intake 510.22 ml   Output --   Net 510.22 ml     Constitutional: Oriented to person, place, and time. Well-developed and well-nourished. Dyspneic at rest.   HENT:  Head: Normocephalic and atraumatic.    Mouth/Throat: Oropharynx is clear and moist. No oropharyngeal exudate. Eyes: Conjunctivae and EOM are normal. Pupils are equal, round, and reactive to light. No scleral icterus. Neck: Normal range of motion. Neck supple. No JVD present. No tracheal deviation present. No thyromegaly present. Cardiovascular: Normal rate, regular rhythm and normal heart sounds.  Exam reveals no gallop and no friction rub.     Pulmonary/Chest: Effort normal, breath sounds coarse. No stridor. Bilateral  Wet rales less severe. Abdominal: Soft. Bowel sounds are normal. No distension and no mass. There is no tenderness. There is no rebound and no guarding. Musculoskeletal: Normal range of motion. There is no edema or tenderness. Neurological: Alert and oriented to person, place, and time. No cranial nerve deficit. Skin: Skin is warm and dry. No rash noted. Not diaphoretic. No erythema. No pallor.      Medications:      guaiFENesin  600 mg Oral BID    cefTRIAXone (ROCEPHIN) IV  1 g Intravenous Q24H    doxycycline (VIBRAMYCIN) IV  100 mg Intravenous Q12H    sodium chloride flush  10 mL Intravenous 2 times per day    enoxaparin  40 mg Subcutaneous Q24H    ipratropium-albuterol  1 ampule Inhalation Q4H WA    methylPREDNISolone  40 mg Intravenous Daily    dorzolamide  2 drop Both Eyes TID    latanoprost  1 drop Both Eyes BID    budesonide  0.25 mg Nebulization BID     sodium chloride flush, magnesium hydroxide, ondansetron, acetaminophen  Dietary Nutrition Supplements: Frozen Oral Supplement  DIET DYSPHAGIA III ADVANCED; Nectar Thick     DVT Prophylaxis: Lovenox    Lab and other Data:     Recent Labs     04/03/19  0300 04/05/19  0416   WBC 22.8* 8.3   HGB 13.3 12.8    168     Recent Labs     04/03/19  0300 04/05/19  0416    143   K 4.2 4.1    104   CO2 24 28   BUN 25* 28*   CREATININE 0.8 0.6   GLUCOSE 171* 106     No results for input(s): AST, ALT, ALB, BILITOT, ALKPHOS in the last 72 hours. Troponin T:   No results for input(s): TROPONINI in the last 72 hours. Pro-BNP: No results for input(s): BNP in the last 72 hours. INR:   No results for input(s): INR in the last 72 hours. ABGs:   Lab Results   Component Value Date    PHART 7.430 04/02/2019    PO2ART 45.0 04/02/2019    ZFE8QVC 43.0 04/02/2019     UA:No results for input(s): NITRITE, COLORU, PHUR, LABCAST, WBCUA, RBCUA, MUCUS, TRICHOMONAS, YEAST, BACTERIA, CLARITYU, SPECGRAV, LEUKOCYTESUR, UROBILINOGEN, BILIRUBINUR, BLOODU, GLUCOSEU, AMORPHOUS in the last 72 hours.     Invalid input(s): Kishor RAND: Noted and reviewed    Micro: Pending    Patient Active Problem List    Diagnosis Date Noted    Palliative care patient 04/03/2019    Hypoxemia 04/02/2019    Leg pain 04/02/2019    Acute hypoxemic respiratory failure ([facility] [address] 04/02/2019    COPD exacerbation ([facility] [address] 04/02/2019    Essential hypertension 04/02/2019       Assessment/plan:   Principal Problem:    Acute hypoxemic respiratory failure (HCC)  Active Problems:    Leg pain    COPD exacerbation (HCC)    Essential hypertension    Palliative care patient  Resolved Problems:    * No resolved hospital problems. *      Plan:     1. Continue care, see above and orders. 2. Add Rocephin  3. Add Mucinex  4. Discontinue continuous pulse oxymetry  5. Continue Solumedrol/Bronchodilators  6. Prognosis guarded  7. Disposition continue inpatient care  8.  Discharge disposition: SNF resident      Kassandra Georges MD  Hospitalist Service  4/5/2019  5:03 PM

## 2019-04-06 LAB
ADENOVIRUS SPECIES BE: NOT DETECTED
ADENOVIRUS SPECIES C: NOT DETECTED
ANION GAP SERPL CALCULATED.3IONS-SCNC: 12 MMOL/L (ref 7–19)
BASOPHILS ABSOLUTE: 0 K/UL (ref 0–0.2)
BASOPHILS RELATIVE PERCENT: 0.1 % (ref 0–1)
BUN BLDV-MCNC: 22 MG/DL (ref 8–23)
CALCIUM SERPL-MCNC: 9.4 MG/DL (ref 8.2–9.6)
CHLORIDE BLD-SCNC: 102 MMOL/L (ref 98–111)
CO2: 27 MMOL/L (ref 22–29)
CREAT SERPL-MCNC: 0.5 MG/DL (ref 0.5–0.9)
EOSINOPHILS ABSOLUTE: 0 K/UL (ref 0–0.6)
EOSINOPHILS RELATIVE PERCENT: 0.1 % (ref 0–5)
GFR NON-AFRICAN AMERICAN: >60
GLUCOSE BLD-MCNC: 99 MG/DL (ref 74–109)
HCT VFR BLD CALC: 40.8 % (ref 37–47)
HEMOGLOBIN: 12.7 G/DL (ref 12–16)
HUMAN METAPNEUMOVIRUS PCR: DETECTED
INFLUENZA A BY PCR: NOT DETECTED
INFLUENZA A H1 (2009) PCR: NOT DETECTED
INFLUENZA A H1 (2009) PCR: NOT DETECTED
INFLUENZA A H3 PCR: NOT DETECTED
INFLUENZA B BY PCR: NOT DETECTED
LYMPHOCYTES ABSOLUTE: 1.4 K/UL (ref 1.1–4.5)
LYMPHOCYTES RELATIVE PERCENT: 19.5 % (ref 20–40)
MCH RBC QN AUTO: 27.3 PG (ref 27–31)
MCHC RBC AUTO-ENTMCNC: 31.1 G/DL (ref 33–37)
MCV RBC AUTO: 87.6 FL (ref 81–99)
MONOCYTES ABSOLUTE: 0.7 K/UL (ref 0–0.9)
MONOCYTES RELATIVE PERCENT: 10.3 % (ref 0–10)
NEUTROPHILS ABSOLUTE: 4.9 K/UL (ref 1.5–7.5)
NEUTROPHILS RELATIVE PERCENT: 69.6 % (ref 50–65)
PARAINFLUENZA 2: NOT DETECTED
PARAINFLUENZA 3: NOT DETECTED
PARAINFLUENZA1: NOT DETECTED
PDW BLD-RTO: 14.5 % (ref 11.5–14.5)
PLATELET # BLD: 190 K/UL (ref 130–400)
PMV BLD AUTO: 11.7 FL (ref 9.4–12.3)
POTASSIUM SERPL-SCNC: 3.8 MMOL/L (ref 3.5–5)
RBC # BLD: 4.66 M/UL (ref 4.2–5.4)
RHINOVIRUS RNA XXX PCR: NOT DETECTED
RSV A AB BY PCR: NOT DETECTED
RSV B AB BY PCR: NOT DETECTED
RVP SOURCE: ABNORMAL
SODIUM BLD-SCNC: 141 MMOL/L (ref 136–145)
WBC # BLD: 7 K/UL (ref 4.8–10.8)

## 2019-04-06 PROCEDURE — 6370000000 HC RX 637 (ALT 250 FOR IP): Performed by: FAMILY MEDICINE

## 2019-04-06 PROCEDURE — 94762 N-INVAS EAR/PLS OXIMTRY CONT: CPT

## 2019-04-06 PROCEDURE — 6370000000 HC RX 637 (ALT 250 FOR IP): Performed by: INTERNAL MEDICINE

## 2019-04-06 PROCEDURE — 99232 SBSQ HOSP IP/OBS MODERATE 35: CPT | Performed by: FAMILY MEDICINE

## 2019-04-06 PROCEDURE — 2580000003 HC RX 258: Performed by: FAMILY MEDICINE

## 2019-04-06 PROCEDURE — 2700000000 HC OXYGEN THERAPY PER DAY

## 2019-04-06 PROCEDURE — 2580000003 HC RX 258: Performed by: EMERGENCY MEDICINE

## 2019-04-06 PROCEDURE — 94640 AIRWAY INHALATION TREATMENT: CPT

## 2019-04-06 PROCEDURE — 80048 BASIC METABOLIC PNL TOTAL CA: CPT

## 2019-04-06 PROCEDURE — 1210000000 HC MED SURG R&B

## 2019-04-06 PROCEDURE — 2500000003 HC RX 250 WO HCPCS: Performed by: EMERGENCY MEDICINE

## 2019-04-06 PROCEDURE — 6360000002 HC RX W HCPCS: Performed by: FAMILY MEDICINE

## 2019-04-06 PROCEDURE — 85025 COMPLETE CBC W/AUTO DIFF WBC: CPT

## 2019-04-06 PROCEDURE — 6360000002 HC RX W HCPCS: Performed by: INTERNAL MEDICINE

## 2019-04-06 RX ORDER — HYDRALAZINE HYDROCHLORIDE 20 MG/ML
5 INJECTION INTRAMUSCULAR; INTRAVENOUS EVERY 6 HOURS PRN
Status: DISCONTINUED | OUTPATIENT
Start: 2019-04-06 | End: 2019-04-07 | Stop reason: HOSPADM

## 2019-04-06 RX ORDER — AMLODIPINE BESYLATE 5 MG/1
5 TABLET ORAL DAILY
Status: DISCONTINUED | OUTPATIENT
Start: 2019-04-06 | End: 2019-04-07 | Stop reason: HOSPADM

## 2019-04-06 RX ADMIN — DORZOLAMIDE HYDROCHLORIDE 2 DROP: 20 SOLUTION/ DROPS OPHTHALMIC at 09:51

## 2019-04-06 RX ADMIN — Medication 10 ML: at 10:01

## 2019-04-06 RX ADMIN — METHYLPREDNISOLONE SODIUM SUCCINATE 40 MG: 40 INJECTION, POWDER, FOR SOLUTION INTRAMUSCULAR; INTRAVENOUS at 09:51

## 2019-04-06 RX ADMIN — IPRATROPIUM BROMIDE AND ALBUTEROL SULFATE 1 AMPULE: .5; 3 SOLUTION RESPIRATORY (INHALATION) at 10:40

## 2019-04-06 RX ADMIN — DOXYCYCLINE 100 MG: 100 INJECTION, POWDER, LYOPHILIZED, FOR SOLUTION INTRAVENOUS at 12:03

## 2019-04-06 RX ADMIN — ENOXAPARIN SODIUM 40 MG: 100 INJECTION SUBCUTANEOUS at 16:57

## 2019-04-06 RX ADMIN — GUAIFENESIN 600 MG: 600 TABLET, EXTENDED RELEASE ORAL at 20:19

## 2019-04-06 RX ADMIN — BUDESONIDE 250 MCG: 0.25 SUSPENSION RESPIRATORY (INHALATION) at 07:05

## 2019-04-06 RX ADMIN — IPRATROPIUM BROMIDE AND ALBUTEROL SULFATE 1 AMPULE: .5; 3 SOLUTION RESPIRATORY (INHALATION) at 07:05

## 2019-04-06 RX ADMIN — BUDESONIDE 250 MCG: 0.25 SUSPENSION RESPIRATORY (INHALATION) at 19:10

## 2019-04-06 RX ADMIN — DORZOLAMIDE HYDROCHLORIDE 2 DROP: 20 SOLUTION/ DROPS OPHTHALMIC at 20:19

## 2019-04-06 RX ADMIN — GUAIFENESIN 600 MG: 600 TABLET, EXTENDED RELEASE ORAL at 09:51

## 2019-04-06 RX ADMIN — AMLODIPINE BESYLATE 5 MG: 5 TABLET ORAL at 09:52

## 2019-04-06 RX ADMIN — LATANOPROST 1 DROP: 50 SOLUTION OPHTHALMIC at 20:19

## 2019-04-06 RX ADMIN — LATANOPROST 1 DROP: 50 SOLUTION OPHTHALMIC at 09:51

## 2019-04-06 RX ADMIN — CEFTRIAXONE 1 G: 1 INJECTION, POWDER, FOR SOLUTION INTRAMUSCULAR; INTRAVENOUS at 16:57

## 2019-04-06 RX ADMIN — DORZOLAMIDE HYDROCHLORIDE 2 DROP: 20 SOLUTION/ DROPS OPHTHALMIC at 16:58

## 2019-04-06 RX ADMIN — IPRATROPIUM BROMIDE AND ALBUTEROL SULFATE 1 AMPULE: .5; 3 SOLUTION RESPIRATORY (INHALATION) at 19:10

## 2019-04-06 RX ADMIN — HYDRALAZINE HYDROCHLORIDE 5 MG: 20 INJECTION INTRAMUSCULAR; INTRAVENOUS at 02:40

## 2019-04-06 ASSESSMENT — PAIN SCALES - GENERAL
PAINLEVEL_OUTOF10: 0
PAINLEVEL_OUTOF10: 0

## 2019-04-06 NOTE — PROGRESS NOTES
[facility]      Patient:  Lindy Severe  YOB: 1924  Date of Service: 4/6/2019  MRN: [id number]   Acct: [de-identified]   Primary Care Physician: Nixon Ribeiro  Advance Directive: DNR-CC  Admit Date: 4/2/2019       Hospital Day: 4    CHIEF COMPLAINT Shortness of breath    Subjective:  Resting comfortably, no new complaints. Objective:   VITALS:  /72   Pulse 101   Temp 99.5 °F (37.5 °C) (Temporal)   Resp 20   Ht 5' 5\" (1.651 m)   Wt 107 lb (48.5 kg)   SpO2 92%   BMI 17.81 kg/m²   24HR INTAKE/OUTPUT:      Intake/Output Summary (Last 24 hours) at 4/6/2019 1551  Last data filed at 4/6/2019 1024  Gross per 24 hour   Intake 240 ml   Output --   Net 240 ml     Constitutional: Oriented to person, place, and time. Well-developed and well-nourished. Dyspneic at rest.   HENT:  Head: Normocephalic and atraumatic.    Mouth/Throat: Oropharynx is clear and moist. No oropharyngeal exudate. Eyes: Conjunctivae and EOM are normal. Pupils are equal, round, and reactive to light. No scleral icterus. Neck: Normal range of motion. Neck supple. No JVD present. No tracheal deviation present. No thyromegaly present. Cardiovascular: Normal rate, regular rhythm and normal heart sounds.  Exam reveals no gallop and no friction rub.     Pulmonary/Chest: Effort normal, breath sounds coarse. No stridor. Bilateral airway rales, improving with cough. Abdominal: Soft. Bowel sounds are normal. No distension and no mass. There is no tenderness. There is no rebound and no guarding. Musculoskeletal: Normal range of motion. There is no edema or tenderness. Neurological: Alert and oriented to person, place, and time. No cranial nerve deficit. Skin: Skin is warm and dry. No rash noted. Not diaphoretic. No erythema. No pallor.      Medications:      amLODIPine  5 mg Oral Daily    guaiFENesin  600 mg Oral BID    cefTRIAXone (ROCEPHIN) IV  1 g Intravenous Q24H    doxycycline (VIBRAMYCIN) IV  100 mg Intravenous Q12H hypertension    Palliative care patient  Resolved Problems:    * No resolved hospital problems. *      Plan:     1. Continue care, see above and orders. 2. Added Rocephin  3. Added Mucinex  4. Discontinued continuous pulse oxymetry  5. Continue Solumedrol/Bronchodilators  6. Prognosis guarded  7. Disposition continue inpatient care  8. Discharge disposition: SNF resident, hopefully return to SNF tomorrow.       German Nam MD  Hospitalist Service  4/6/2019  3:51 PM

## 2019-04-06 NOTE — PLAN OF CARE
Problem: Falls - Risk of:  Goal: Will remain free from falls  Description  Will remain free from falls  4/6/2019 1802 by Salma Espinal RN  Outcome: Ongoing  4/6/2019 1052 by Salma Espinal RN  Outcome: Ongoing  4/6/2019 0454 by Ajit Blanton RN  Outcome: Ongoing  Goal: Absence of physical injury  Description  Absence of physical injury  4/6/2019 1802 by Salma Espinal RN  Outcome: Ongoing  4/6/2019 1052 by Salma Espinal RN  Outcome: Ongoing  4/6/2019 0454 by Ajit Blanton RN  Outcome: Ongoing     Problem: Pain:  Goal: Pain level will decrease  Description  Pain level will decrease  4/6/2019 1802 by Salma Espinal RN  Outcome: Ongoing  4/6/2019 1052 by Salma Espinal RN  Outcome: Ongoing  4/6/2019 0454 by Ajit Blanton RN  Outcome: Ongoing  Goal: Control of acute pain  Description  Control of acute pain  4/6/2019 1802 by Salma Espinal RN  Outcome: Ongoing  4/6/2019 1052 by Salma Espinal RN  Outcome: Ongoing  4/6/2019 0454 by Ajit Blanton RN  Outcome: Ongoing  Goal: Control of chronic pain  Description  Control of chronic pain  4/6/2019 1802 by Salma Espinal RN  Outcome: Ongoing  4/6/2019 1052 by Salma Espinal RN  Outcome: Ongoing  4/6/2019 0454 by Ajit Blanton RN  Outcome: Ongoing     Problem: Risk for Impaired Skin Integrity  Goal: Tissue integrity - skin and mucous membranes  Description  Structural intactness and normal physiological function of skin and  mucous membranes.   4/6/2019 1802 by Salma Espinal RN  Outcome: Ongoing  4/6/2019 1052 by Salma Espinal RN  Outcome: Ongoing  4/6/2019 0454 by Ajit Blanton RN  Outcome: Ongoing     Problem: Breathing Pattern - Ineffective:  Goal: Ability to achieve and maintain a regular respiratory rate will improve  Description  Ability to achieve and maintain a regular respiratory rate will improve  4/6/2019 1802 by Salma Espinal RN  Outcome: Ongoing  4/6/2019 1052 by Salma Espinal RN  Outcome: Ongoing  4/6/2019 0454 by Ajit Blanton RN  Outcome: Ongoing

## 2019-04-07 VITALS
BODY MASS INDEX: 17.83 KG/M2 | HEART RATE: 105 BPM | SYSTOLIC BLOOD PRESSURE: 134 MMHG | HEIGHT: 65 IN | WEIGHT: 107 LBS | RESPIRATION RATE: 24 BRPM | DIASTOLIC BLOOD PRESSURE: 83 MMHG | OXYGEN SATURATION: 92 % | TEMPERATURE: 98.3 F

## 2019-04-07 LAB
ANION GAP SERPL CALCULATED.3IONS-SCNC: 11 MMOL/L (ref 7–19)
BASOPHILS ABSOLUTE: 0 K/UL (ref 0–0.2)
BASOPHILS RELATIVE PERCENT: 0.1 % (ref 0–1)
BLOOD CULTURE, ROUTINE: NORMAL
BUN BLDV-MCNC: 27 MG/DL (ref 8–23)
CALCIUM SERPL-MCNC: 9.3 MG/DL (ref 8.2–9.6)
CHLORIDE BLD-SCNC: 107 MMOL/L (ref 98–111)
CO2: 26 MMOL/L (ref 22–29)
CREAT SERPL-MCNC: 0.5 MG/DL (ref 0.5–0.9)
CULTURE, BLOOD 2: NORMAL
EOSINOPHILS ABSOLUTE: 0 K/UL (ref 0–0.6)
EOSINOPHILS RELATIVE PERCENT: 0.1 % (ref 0–5)
GFR NON-AFRICAN AMERICAN: >60
GLUCOSE BLD-MCNC: 89 MG/DL (ref 74–109)
HCT VFR BLD CALC: 39.8 % (ref 37–47)
HEMOGLOBIN: 12.1 G/DL (ref 12–16)
LYMPHOCYTES ABSOLUTE: 2 K/UL (ref 1.1–4.5)
LYMPHOCYTES RELATIVE PERCENT: 28.2 % (ref 20–40)
MCH RBC QN AUTO: 26.6 PG (ref 27–31)
MCHC RBC AUTO-ENTMCNC: 30.4 G/DL (ref 33–37)
MCV RBC AUTO: 87.5 FL (ref 81–99)
MONOCYTES ABSOLUTE: 0.7 K/UL (ref 0–0.9)
MONOCYTES RELATIVE PERCENT: 9.3 % (ref 0–10)
NEUTROPHILS ABSOLUTE: 4.4 K/UL (ref 1.5–7.5)
NEUTROPHILS RELATIVE PERCENT: 62 % (ref 50–65)
PDW BLD-RTO: 14.5 % (ref 11.5–14.5)
PLATELET # BLD: 214 K/UL (ref 130–400)
PMV BLD AUTO: 11.2 FL (ref 9.4–12.3)
POTASSIUM SERPL-SCNC: 4.1 MMOL/L (ref 3.5–5)
RBC # BLD: 4.55 M/UL (ref 4.2–5.4)
SODIUM BLD-SCNC: 144 MMOL/L (ref 136–145)
WBC # BLD: 7.1 K/UL (ref 4.8–10.8)

## 2019-04-07 PROCEDURE — 94762 N-INVAS EAR/PLS OXIMTRY CONT: CPT

## 2019-04-07 PROCEDURE — 36415 COLL VENOUS BLD VENIPUNCTURE: CPT

## 2019-04-07 PROCEDURE — 80048 BASIC METABOLIC PNL TOTAL CA: CPT

## 2019-04-07 PROCEDURE — 85025 COMPLETE CBC W/AUTO DIFF WBC: CPT

## 2019-04-07 PROCEDURE — 6370000000 HC RX 637 (ALT 250 FOR IP): Performed by: FAMILY MEDICINE

## 2019-04-07 PROCEDURE — 6370000000 HC RX 637 (ALT 250 FOR IP): Performed by: INTERNAL MEDICINE

## 2019-04-07 PROCEDURE — 94640 AIRWAY INHALATION TREATMENT: CPT

## 2019-04-07 PROCEDURE — 6360000002 HC RX W HCPCS: Performed by: FAMILY MEDICINE

## 2019-04-07 PROCEDURE — 2700000000 HC OXYGEN THERAPY PER DAY

## 2019-04-07 PROCEDURE — 2580000003 HC RX 258: Performed by: EMERGENCY MEDICINE

## 2019-04-07 PROCEDURE — 99239 HOSP IP/OBS DSCHRG MGMT >30: CPT | Performed by: FAMILY MEDICINE

## 2019-04-07 PROCEDURE — 2500000003 HC RX 250 WO HCPCS: Performed by: EMERGENCY MEDICINE

## 2019-04-07 RX ORDER — DOXYCYCLINE HYCLATE 100 MG/1
100 CAPSULE ORAL EVERY 12 HOURS SCHEDULED
Status: DISCONTINUED | OUTPATIENT
Start: 2019-04-07 | End: 2019-04-07 | Stop reason: HOSPADM

## 2019-04-07 RX ORDER — CEFDINIR 300 MG/1
300 CAPSULE ORAL EVERY 12 HOURS SCHEDULED
Status: DISCONTINUED | OUTPATIENT
Start: 2019-04-07 | End: 2019-04-07 | Stop reason: HOSPADM

## 2019-04-07 RX ORDER — CEFDINIR 300 MG/1
300 CAPSULE ORAL EVERY 12 HOURS SCHEDULED
Qty: 20 CAPSULE | Refills: 0 | DISCHARGE
Start: 2019-04-07 | End: 2019-04-17

## 2019-04-07 RX ORDER — DOXYCYCLINE HYCLATE 100 MG/1
100 CAPSULE ORAL EVERY 12 HOURS SCHEDULED
Qty: 20 CAPSULE | Refills: 0 | DISCHARGE
Start: 2019-04-07 | End: 2019-04-17

## 2019-04-07 RX ORDER — GUAIFENESIN 600 MG/1
600 TABLET, EXTENDED RELEASE ORAL 2 TIMES DAILY
Status: ON HOLD | DISCHARGE
Start: 2019-04-07 | End: 2019-08-23

## 2019-04-07 RX ADMIN — AMLODIPINE BESYLATE 5 MG: 5 TABLET ORAL at 08:00

## 2019-04-07 RX ADMIN — BUDESONIDE 250 MCG: 0.25 SUSPENSION RESPIRATORY (INHALATION) at 19:33

## 2019-04-07 RX ADMIN — GUAIFENESIN 600 MG: 600 TABLET, EXTENDED RELEASE ORAL at 08:00

## 2019-04-07 RX ADMIN — IPRATROPIUM BROMIDE AND ALBUTEROL SULFATE 1 AMPULE: .5; 3 SOLUTION RESPIRATORY (INHALATION) at 19:33

## 2019-04-07 RX ADMIN — DORZOLAMIDE HYDROCHLORIDE 2 DROP: 20 SOLUTION/ DROPS OPHTHALMIC at 08:01

## 2019-04-07 RX ADMIN — IPRATROPIUM BROMIDE AND ALBUTEROL SULFATE 1 AMPULE: .5; 3 SOLUTION RESPIRATORY (INHALATION) at 14:24

## 2019-04-07 RX ADMIN — BUDESONIDE 250 MCG: 0.25 SUSPENSION RESPIRATORY (INHALATION) at 06:36

## 2019-04-07 RX ADMIN — ENOXAPARIN SODIUM 40 MG: 100 INJECTION SUBCUTANEOUS at 13:59

## 2019-04-07 RX ADMIN — DOXYCYCLINE 100 MG: 100 INJECTION, POWDER, LYOPHILIZED, FOR SOLUTION INTRAVENOUS at 05:35

## 2019-04-07 RX ADMIN — METHYLPREDNISOLONE SODIUM SUCCINATE 40 MG: 40 INJECTION, POWDER, FOR SOLUTION INTRAMUSCULAR; INTRAVENOUS at 08:00

## 2019-04-07 RX ADMIN — IPRATROPIUM BROMIDE AND ALBUTEROL SULFATE 1 AMPULE: .5; 3 SOLUTION RESPIRATORY (INHALATION) at 10:25

## 2019-04-07 RX ADMIN — LATANOPROST 1 DROP: 50 SOLUTION OPHTHALMIC at 08:01

## 2019-04-07 RX ADMIN — IPRATROPIUM BROMIDE AND ALBUTEROL SULFATE 1 AMPULE: .5; 3 SOLUTION RESPIRATORY (INHALATION) at 06:36

## 2019-04-07 NOTE — PLAN OF CARE
Problem: Falls - Risk of:  Goal: Will remain free from falls  Description  Will remain free from falls  4/7/2019 1300 by Марина Donis RN  Outcome: Ongoing  4/7/2019 0454 by Sherman Orourke RN  Outcome: Ongoing  Goal: Absence of physical injury  Description  Absence of physical injury  4/7/2019 1300 by Марина Donis RN  Outcome: Ongoing  4/7/2019 0454 by Sherman Orourke RN  Outcome: Ongoing     Problem: Pain:  Goal: Pain level will decrease  Description  Pain level will decrease  4/7/2019 1300 by Марина Donis RN  Outcome: Ongoing  4/7/2019 0454 by Sherman Orourke RN  Outcome: Ongoing  Goal: Control of acute pain  Description  Control of acute pain  4/7/2019 1300 by Марина Donis RN  Outcome: Ongoing  4/7/2019 0454 by Sherman Orourke RN  Outcome: Ongoing  Goal: Control of chronic pain  Description  Control of chronic pain  4/7/2019 1300 by Марина Donis RN  Outcome: Ongoing  4/7/2019 0454 by Sherman Orourke RN  Outcome: Ongoing     Problem: Risk for Impaired Skin Integrity  Goal: Tissue integrity - skin and mucous membranes  Description  Structural intactness and normal physiological function of skin and  mucous membranes.   4/7/2019 1300 by Марина Donis RN  Outcome: Ongoing  4/7/2019 0454 by Sherman Orourke RN  Outcome: Ongoing     Problem: Breathing Pattern - Ineffective:  Goal: Ability to achieve and maintain a regular respiratory rate will improve  Description  Ability to achieve and maintain a regular respiratory rate will improve  4/7/2019 1300 by Марина Donis RN  Outcome: Ongoing  4/7/2019 0454 by Sherman Orourke RN  Outcome: Ongoing

## 2019-04-07 NOTE — PLAN OF CARE
Problem: Falls - Risk of:  Goal: Will remain free from falls  Description  Will remain free from falls  4/7/2019 0454 by Amalia Dolan RN  Outcome: Ongoing  4/6/2019 1802 by Danny Perez RN  Outcome: Ongoing  Goal: Absence of physical injury  Description  Absence of physical injury  4/7/2019 0454 by Amalia Dolan RN  Outcome: Ongoing  4/6/2019 1802 by Danny Perez RN  Outcome: Ongoing     Problem: Pain:  Goal: Pain level will decrease  Description  Pain level will decrease  4/7/2019 0454 by Amalia Dolan RN  Outcome: Ongoing  4/6/2019 1802 by Danny Perez RN  Outcome: Ongoing  Goal: Control of acute pain  Description  Control of acute pain  4/7/2019 0454 by Amalia Dolan RN  Outcome: Ongoing  4/6/2019 1802 by Danny Perez RN  Outcome: Ongoing  Goal: Control of chronic pain  Description  Control of chronic pain  4/7/2019 0454 by Amalia Dolan RN  Outcome: Ongoing  4/6/2019 1802 by Danny Perez RN  Outcome: Ongoing     Problem: Risk for Impaired Skin Integrity  Goal: Tissue integrity - skin and mucous membranes  Description  Structural intactness and normal physiological function of skin and  mucous membranes.   4/7/2019 0454 by Amalia Dolan RN  Outcome: Ongoing  4/6/2019 1802 by Danny Perez RN  Outcome: Ongoing     Problem: Breathing Pattern - Ineffective:  Goal: Ability to achieve and maintain a regular respiratory rate will improve  Description  Ability to achieve and maintain a regular respiratory rate will improve  4/7/2019 0454 by Amalia Dolan RN  Outcome: Ongoing  4/6/2019 1802 by Danny Perez RN  Outcome: Ongoing

## 2019-04-07 NOTE — DISCHARGE SUMMARY
Hospitalist   Discharge Summary    [facility]  :  1924  MRN:  [id number]    Admit date:  2019  Discharge date:  2019    Admitting Physician:  Kim Esqueda MD    Advance Directive: SPECIAL[facility]    Consults: Palliative care    Primary Care Physician:  Mary Davies    Discharge Diagnoses:  Principal Problem:    Acute hypoxemic respiratory failure ([address] due to COPD exacerbation and aspiration pneumonia from unknown organism  Active Problems:    Leg pain    COPD exacerbation [facility])    Essential hypertension    Palliative care patient  Resolved Problems:    * No resolved hospital problems. *      Significant Diagnostic Studies:   Xr Chest Portable    Result Date: 2019  EXAMINATION: XR CHEST PORTABLE 2019 7:14 AM XR CHEST PORTABLE 2019 5:45 AM HISTORY: Respiratory distress COMPARISON: 2015. FINDINGS: The lungs are clear. The cardiomediastinal silhouette and pulmonary vascularity are within normal limits. The anila are slightly prominent but stable compared to 2015. Mild scoliosis is present. .    1. No radiographic evidence of acute cardiopulmonary process. Signed by Dr Ashkan Ashton on 2019 7:15 AM    Cta Pulmonary W Contrast    Result Date: 2019  History: 80year-old with hypoxia and shortness of breath. Reference: CTA chest 2014 Technique: Following the administration of intravenous contrast, helical acquisition was obtained from the thoracic inlet through the diaphragm during the arterial phase. Multiplanar reconstructed images including 3D MIP were obtained. For this CT exam, one or more of the following dose reduction techniques was employed: -automated exposure control -mA and/or kVp adjustment for patient size -iterative reconstruction  mGy-cm Findings: Vascular findings: No pulmonary embolus. Dilatation Central pulmonary arteries suggest chronic pulmonary artery hypertension.  There is moderate atherosclerosis of the thoracic aorta without aneurysm or (1.651 m)   Wt 107 lb (48.5 kg)   SpO2 92%   BMI 17.81 kg/m²   General appearance: alert, appears stated age and cooperative  Skin: Skin color, texture, turgor normal. No rashes or lesions   HEENT: Head: Normocephalic, no lesions, without obvious abnormality.   Neck: no adenopathy, no carotid bruit, no JVD, supple, symmetrical, trachea midline and thyroid not enlarged, symmetric, no tenderness/mass/nodules  Lungs: clear to auscultation bilaterally and improving airway rales left base  Heart: regular rate and rhythm, S1, S2 normal, no murmur, click, rub or gallop  Abdomen: soft, non-tender; bowel sounds normal; no masses,  no organomegaly  Extremities: extremities normal, atraumatic, no cyanosis or edema  Neurologic: Mental status: Alert, oriented, thought content appropriate    Discharge Medications:       Dillan Leija   [place] Medication Instructions [id number]    Printed on:04/07/19 8109   Medication Information                      albuterol-ipratropium (COMBIVENT RESPIMAT)  MCG/ACT AERS inhaler  Inhale 1 puff into the lungs every 6 hours             amLODIPine (NORVASC) 5 MG tablet  Take 5 mg by mouth daily             budesonide-formoterol (SYMBICORT) 80-4.5 MCG/ACT AERO  Inhale 2 puffs into the lungs 2 times daily             cefdinir (OMNICEF) 300 MG capsule  Take 1 capsule by mouth every 12 hours for 10 days             dorzolamide (TRUSOPT) 2 % ophthalmic solution  Place 2 drops into both eyes 3 times daily             doxycycline hyclate (VIBRAMYCIN) 100 MG capsule  Take 1 capsule by mouth every 12 hours for 10 days             furosemide (LASIX) 20 MG tablet  Take 20 mg by mouth 2 times daily             guaiFENesin (MUCINEX) 600 MG extended release tablet  Take 1 tablet by mouth 2 times daily             ipratropium-albuterol (DUONEB) 0.5-2.5 (3) MG/3ML SOLN nebulizer solution  Inhale 1 vial into the lungs every 4 hours             latanoprost (XALATAN) 0.005 % ophthalmic solution  Place 1

## 2019-04-08 NOTE — PLAN OF CARE
Problem: Falls - Risk of:  Goal: Will remain free from falls  Description  Will remain free from falls  4/7/2019 1947 by Stephen Kay RN  Outcome: Completed  4/7/2019 1300 by Nicki Lopez RN  Outcome: Ongoing  Goal: Absence of physical injury  Description  Absence of physical injury  4/7/2019 1947 by Stephen Kay RN  Outcome: Completed  4/7/2019 1300 by Nicki Lopez RN  Outcome: Ongoing     Problem: Pain:  Goal: Pain level will decrease  Description  Pain level will decrease  4/7/2019 1947 by Stephen Kay RN  Outcome: Completed  4/7/2019 1300 by Nicki Lopez RN  Outcome: Ongoing  Goal: Control of acute pain  Description  Control of acute pain  4/7/2019 1947 by Stephen Kay RN  Outcome: Completed  4/7/2019 1300 by Nicki Lopez RN  Outcome: Ongoing  Goal: Control of chronic pain  Description  Control of chronic pain  4/7/2019 1947 by Stephen Kay RN  Outcome: Completed  4/7/2019 1300 by Nicki Lopez RN  Outcome: Ongoing     Problem: Risk for Impaired Skin Integrity  Goal: Tissue integrity - skin and mucous membranes  Description  Structural intactness and normal physiological function of skin and  mucous membranes.   4/7/2019 1947 by Stephen Kay RN  Outcome: Completed  4/7/2019 1300 by Nicki Lopez RN  Outcome: Ongoing     Problem: Breathing Pattern - Ineffective:  Goal: Ability to achieve and maintain a regular respiratory rate will improve  Description  Ability to achieve and maintain a regular respiratory rate will improve  4/7/2019 1947 by Stephen Kay RN  Outcome: Completed  4/7/2019 1300 by Nicki Lopez RN  Outcome: Ongoing

## 2019-04-08 NOTE — PLAN OF CARE
Problem: Falls - Risk of:  Goal: Will remain free from falls  Description  Will remain free from falls  4/7/2019 1947 by Alex Louis RN  Outcome: Completed  4/7/2019 1300 by Avelino Self RN  Outcome: Ongoing  Goal: Absence of physical injury  Description  Absence of physical injury  4/7/2019 1947 by Alex Louis RN  Outcome: Completed  4/7/2019 1300 by Avelino Self RN  Outcome: Ongoing     Problem: Pain:  Goal: Pain level will decrease  Description  Pain level will decrease  4/7/2019 1947 by Alex Louis RN  Outcome: Completed  4/7/2019 1300 by Avelino Self RN  Outcome: Ongoing  Goal: Control of acute pain  Description  Control of acute pain  4/7/2019 1947 by Alex Louis RN  Outcome: Completed  4/7/2019 1300 by Avelino Self RN  Outcome: Ongoing  Goal: Control of chronic pain  Description  Control of chronic pain  4/7/2019 1947 by Alex Louis RN  Outcome: Completed  4/7/2019 1300 by Avelino Self RN  Outcome: Ongoing     Problem: Risk for Impaired Skin Integrity  Goal: Tissue integrity - skin and mucous membranes  Description  Structural intactness and normal physiological function of skin and  mucous membranes.   4/7/2019 1947 by Alex Louis RN  Outcome: Completed  4/7/2019 1300 by Avelino Self RN  Outcome: Ongoing     Problem: Breathing Pattern - Ineffective:  Goal: Ability to achieve and maintain a regular respiratory rate will improve  Description  Ability to achieve and maintain a regular respiratory rate will improve  4/7/2019 1947 by Alex Louis RN  Outcome: Completed  4/7/2019 1300 by Avelino Self RN  Outcome: Ongoing     Problem: Falls - Risk of:  Goal: Will remain free from falls  Description  Will remain free from falls  4/7/2019 1947 by Alex Louis RN  Outcome: Completed  4/7/2019 1300 by Avelino Self RN  Outcome: Ongoing  Goal: Absence of physical injury  Description  Absence of physical injury  4/7/2019 1947 by Alex Louis RN  Outcome: Completed  4/7/2019 1300 by Emilee White RN  Outcome: Ongoing     Problem: Pain:  Goal: Pain level will decrease  Description  Pain level will decrease  4/7/2019 1947 by Mimi Mathias RN  Outcome: Completed  4/7/2019 1300 by Emilee White RN  Outcome: Ongoing  Goal: Control of acute pain  Description  Control of acute pain  4/7/2019 1947 by Mimi Mathias RN  Outcome: Completed  4/7/2019 1300 by Emilee White RN  Outcome: Ongoing  Goal: Control of chronic pain  Description  Control of chronic pain  4/7/2019 1947 by Mimi Mathias RN  Outcome: Completed  4/7/2019 1300 by Emilee White RN  Outcome: Ongoing     Problem: Risk for Impaired Skin Integrity  Goal: Tissue integrity - skin and mucous membranes  Description  Structural intactness and normal physiological function of skin and  mucous membranes.   4/7/2019 1947 by Mimi Mathias RN  Outcome: Completed  4/7/2019 1300 by Emilee White RN  Outcome: Ongoing     Problem: Breathing Pattern - Ineffective:  Goal: Ability to achieve and maintain a regular respiratory rate will improve  Description  Ability to achieve and maintain a regular respiratory rate will improve  4/7/2019 1947 by Mimi Mathias RN  Outcome: Completed  4/7/2019 1300 by Emilee White RN  Outcome: Ongoing     Problem: Falls - Risk of:  Goal: Will remain free from falls  Description  Will remain free from falls  4/7/2019 1947 by Mimi Mathias RN  Outcome: Completed  4/7/2019 1300 by Emilee White RN  Outcome: Ongoing  Goal: Absence of physical injury  Description  Absence of physical injury  4/7/2019 1947 by Mimi Mathias RN  Outcome: Completed  4/7/2019 1300 by Emilee White RN  Outcome: Ongoing     Problem: Pain:  Goal: Pain level will decrease  Description  Pain level will decrease  4/7/2019 1947 by Mimi Mathias RN  Outcome: Completed  4/7/2019 1300 by Emilee White RN  Outcome: Ongoing  Goal: Control of acute pain  Description  Control of acute pain  4/7/2019 1947 by Oleg Ravi RN  Outcome: Completed  4/7/2019 1300 by Elizabeth Gillis RN  Outcome: Ongoing  Goal: Control of chronic pain  Description  Control of chronic pain  4/7/2019 1947 by Oleg Ravi RN  Outcome: Completed  4/7/2019 1300 by Elizabeth Gillis RN  Outcome: Ongoing     Problem: Risk for Impaired Skin Integrity  Goal: Tissue integrity - skin and mucous membranes  Description  Structural intactness and normal physiological function of skin and  mucous membranes.   4/7/2019 1947 by Oleg Ravi RN  Outcome: Completed  4/7/2019 1300 by Elizabeth Gillis RN  Outcome: Ongoing     Problem: Breathing Pattern - Ineffective:  Goal: Ability to achieve and maintain a regular respiratory rate will improve  Description  Ability to achieve and maintain a regular respiratory rate will improve  4/7/2019 1947 by Oleg Ravi RN  Outcome: Completed  4/7/2019 1300 by Elizabeth Gillis RN  Outcome: Ongoing

## 2019-04-08 NOTE — PROGRESS NOTES
Received verbal order from Dr Alden Retana for pt to transfer back to SNF per ambulance ( weak/bedridden/requires oxygen). MD also states that isolation is no longer indicated.  Electronically signed by Amalia Dolan RN on 4/8/2019 at 2:17 AM

## 2019-04-08 NOTE — PROGRESS NOTES
Report called to [facility] to [place]. [facility] notifed as well and on the way. Daughter left after doctor rounded and took patients personal belongings.

## 2019-04-08 NOTE — PLAN OF CARE
Problem: Falls - Risk of:  Goal: Will remain free from falls  Description  Will remain free from falls  4/7/2019 1947 by Zane Triana RN  Outcome: Completed  4/7/2019 1300 by Josefina Castillo RN  Outcome: Ongoing  Goal: Absence of physical injury  Description  Absence of physical injury  4/7/2019 1947 by Zane Triana RN  Outcome: Completed  4/7/2019 1300 by Josefina Castillo RN  Outcome: Ongoing     Problem: Pain:  Goal: Pain level will decrease  Description  Pain level will decrease  4/7/2019 1947 by Zane Triana RN  Outcome: Completed  4/7/2019 1300 by Josefina Castillo RN  Outcome: Ongoing  Goal: Control of acute pain  Description  Control of acute pain  4/7/2019 1947 by Zane Triana RN  Outcome: Completed  4/7/2019 1300 by Josefina Castillo RN  Outcome: Ongoing  Goal: Control of chronic pain  Description  Control of chronic pain  4/7/2019 1947 by Zane Triana RN  Outcome: Completed  4/7/2019 1300 by Josefina Castillo RN  Outcome: Ongoing     Problem: Risk for Impaired Skin Integrity  Goal: Tissue integrity - skin and mucous membranes  Description  Structural intactness and normal physiological function of skin and  mucous membranes.   4/7/2019 1947 by Zane Triana RN  Outcome: Completed  4/7/2019 1300 by Josefina Castillo RN  Outcome: Ongoing     Problem: Breathing Pattern - Ineffective:  Goal: Ability to achieve and maintain a regular respiratory rate will improve  Description  Ability to achieve and maintain a regular respiratory rate will improve  4/7/2019 1947 by Zane Triana RN  Outcome: Completed  4/7/2019 1300 by Josefina Castillo RN  Outcome: Ongoing

## 2019-08-23 ENCOUNTER — APPOINTMENT (OUTPATIENT)
Dept: CT IMAGING | Age: 84
DRG: 190 | End: 2019-08-23
Payer: MEDICARE

## 2019-08-23 ENCOUNTER — APPOINTMENT (OUTPATIENT)
Dept: GENERAL RADIOLOGY | Age: 84
DRG: 190 | End: 2019-08-23
Payer: MEDICARE

## 2019-08-23 ENCOUNTER — HOSPITAL ENCOUNTER (INPATIENT)
Age: 84
LOS: 1 days | Discharge: NURSING, REHAB OR LONG-TERM CARE | DRG: 190 | End: 2019-08-24
Attending: EMERGENCY MEDICINE | Admitting: HOSPITALIST
Payer: MEDICARE

## 2019-08-23 PROBLEM — J96.91 RESPIRATORY FAILURE WITH HYPOXIA (HCC): Status: ACTIVE | Noted: 2019-08-23

## 2019-08-23 PROBLEM — R21 RASH: Status: ACTIVE | Noted: 2019-08-23

## 2019-08-23 LAB
ALBUMIN SERPL-MCNC: 3.8 G/DL (ref 3.5–5.2)
ALP BLD-CCNC: 97 U/L (ref 35–104)
ALT SERPL-CCNC: 10 U/L (ref 5–33)
ANION GAP SERPL CALCULATED.3IONS-SCNC: 12 MMOL/L (ref 7–19)
APTT: 25.2 SEC (ref 26–36.2)
AST SERPL-CCNC: 14 U/L (ref 5–32)
BASE EXCESS ARTERIAL: 5.8 MMOL/L (ref -2–2)
BASOPHILS ABSOLUTE: 0 K/UL (ref 0–0.2)
BASOPHILS RELATIVE PERCENT: 0.4 % (ref 0–1)
BILIRUB SERPL-MCNC: 0.3 MG/DL (ref 0.2–1.2)
BUN BLDV-MCNC: 16 MG/DL (ref 8–23)
CALCIUM SERPL-MCNC: 9.3 MG/DL (ref 8.2–9.6)
CARBOXYHEMOGLOBIN ARTERIAL: 2.1 % (ref 0–5)
CHLORIDE BLD-SCNC: 105 MMOL/L (ref 98–111)
CO2: 27 MMOL/L (ref 22–29)
CREAT SERPL-MCNC: 0.5 MG/DL (ref 0.5–0.9)
D DIMER: 0.9 UG/ML FEU (ref 0–0.48)
EKG P AXIS: 71 DEGREES
EKG P-R INTERVAL: 188 MS
EKG Q-T INTERVAL: 330 MS
EKG QRS DURATION: 74 MS
EKG QTC CALCULATION (BAZETT): 408 MS
EKG T AXIS: 57 DEGREES
EOSINOPHILS ABSOLUTE: 0.7 K/UL (ref 0–0.6)
EOSINOPHILS RELATIVE PERCENT: 9.9 % (ref 0–5)
GFR NON-AFRICAN AMERICAN: >60
GLUCOSE BLD-MCNC: 130 MG/DL (ref 74–109)
HCO3 ARTERIAL: 30.6 MMOL/L (ref 22–26)
HCT VFR BLD CALC: 40.4 % (ref 37–47)
HEMOGLOBIN, ART, EXTENDED: 13.4 G/DL (ref 12–16)
HEMOGLOBIN: 12.8 G/DL (ref 12–16)
IMMATURE GRANULOCYTES #: 0 K/UL
INR BLD: 0.95 (ref 0.88–1.18)
LV EF: 68 %
LVEF MODALITY: NORMAL
LYMPHOCYTES ABSOLUTE: 1.4 K/UL (ref 1.1–4.5)
LYMPHOCYTES RELATIVE PERCENT: 19.3 % (ref 20–40)
MCH RBC QN AUTO: 27.4 PG (ref 27–31)
MCHC RBC AUTO-ENTMCNC: 31.7 G/DL (ref 33–37)
MCV RBC AUTO: 86.3 FL (ref 81–99)
METHEMOGLOBIN ARTERIAL: 1.4 %
MONOCYTES ABSOLUTE: 0.7 K/UL (ref 0–0.9)
MONOCYTES RELATIVE PERCENT: 9.4 % (ref 0–10)
NEUTROPHILS ABSOLUTE: 4.5 K/UL (ref 1.5–7.5)
NEUTROPHILS RELATIVE PERCENT: 60.7 % (ref 50–65)
O2 CONTENT ARTERIAL: 17.2 ML/DL
O2 SAT, ARTERIAL: 91.6 %
O2 THERAPY: ABNORMAL
PCO2 ARTERIAL: 44 MMHG (ref 35–45)
PDW BLD-RTO: 13.2 % (ref 11.5–14.5)
PH ARTERIAL: 7.45 (ref 7.35–7.45)
PLATELET # BLD: 230 K/UL (ref 130–400)
PMV BLD AUTO: 10.5 FL (ref 9.4–12.3)
PO2 ARTERIAL: 58 MMHG (ref 80–100)
POTASSIUM SERPL-SCNC: 3.7 MMOL/L (ref 3.5–5)
POTASSIUM, WHOLE BLOOD: 3.4
PRO-BNP: 115 PG/ML (ref 0–1800)
PRO-BNP: 133 PG/ML (ref 0–1800)
PROTHROMBIN TIME: 12.1 SEC (ref 12–14.6)
RAPID INFLUENZA  B AGN: NEGATIVE
RAPID INFLUENZA A AGN: NEGATIVE
RBC # BLD: 4.68 M/UL (ref 4.2–5.4)
SODIUM BLD-SCNC: 144 MMOL/L (ref 136–145)
TOTAL PROTEIN: 6.9 G/DL (ref 6.6–8.7)
TROPONIN: <0.01 NG/ML (ref 0–0.03)
WBC # BLD: 7.4 K/UL (ref 4.8–10.8)

## 2019-08-23 PROCEDURE — 96365 THER/PROPH/DIAG IV INF INIT: CPT

## 2019-08-23 PROCEDURE — 2580000003 HC RX 258: Performed by: EMERGENCY MEDICINE

## 2019-08-23 PROCEDURE — 82803 BLOOD GASES ANY COMBINATION: CPT

## 2019-08-23 PROCEDURE — 94640 AIRWAY INHALATION TREATMENT: CPT

## 2019-08-23 PROCEDURE — 85610 PROTHROMBIN TIME: CPT

## 2019-08-23 PROCEDURE — 87804 INFLUENZA ASSAY W/OPTIC: CPT

## 2019-08-23 PROCEDURE — 83880 ASSAY OF NATRIURETIC PEPTIDE: CPT

## 2019-08-23 PROCEDURE — 71045 X-RAY EXAM CHEST 1 VIEW: CPT

## 2019-08-23 PROCEDURE — 86787 VARICELLA-ZOSTER ANTIBODY: CPT

## 2019-08-23 PROCEDURE — 85730 THROMBOPLASTIN TIME PARTIAL: CPT

## 2019-08-23 PROCEDURE — 6370000000 HC RX 637 (ALT 250 FOR IP): Performed by: HOSPITALIST

## 2019-08-23 PROCEDURE — 93005 ELECTROCARDIOGRAM TRACING: CPT

## 2019-08-23 PROCEDURE — 36600 WITHDRAWAL OF ARTERIAL BLOOD: CPT

## 2019-08-23 PROCEDURE — 85025 COMPLETE CBC W/AUTO DIFF WBC: CPT

## 2019-08-23 PROCEDURE — 71275 CT ANGIOGRAPHY CHEST: CPT

## 2019-08-23 PROCEDURE — 6360000004 HC RX CONTRAST MEDICATION: Performed by: HOSPITALIST

## 2019-08-23 PROCEDURE — 36415 COLL VENOUS BLD VENIPUNCTURE: CPT

## 2019-08-23 PROCEDURE — 6360000002 HC RX W HCPCS: Performed by: HOSPITALIST

## 2019-08-23 PROCEDURE — 99285 EMERGENCY DEPT VISIT HI MDM: CPT

## 2019-08-23 PROCEDURE — 93306 TTE W/DOPPLER COMPLETE: CPT

## 2019-08-23 PROCEDURE — 2700000000 HC OXYGEN THERAPY PER DAY

## 2019-08-23 PROCEDURE — 93970 EXTREMITY STUDY: CPT

## 2019-08-23 PROCEDURE — 84132 ASSAY OF SERUM POTASSIUM: CPT

## 2019-08-23 PROCEDURE — 6370000000 HC RX 637 (ALT 250 FOR IP): Performed by: EMERGENCY MEDICINE

## 2019-08-23 PROCEDURE — 2580000003 HC RX 258: Performed by: HOSPITALIST

## 2019-08-23 PROCEDURE — 6360000002 HC RX W HCPCS: Performed by: EMERGENCY MEDICINE

## 2019-08-23 PROCEDURE — 80053 COMPREHEN METABOLIC PANEL: CPT

## 2019-08-23 PROCEDURE — 84484 ASSAY OF TROPONIN QUANT: CPT

## 2019-08-23 PROCEDURE — 85379 FIBRIN DEGRADATION QUANT: CPT

## 2019-08-23 PROCEDURE — 1210000000 HC MED SURG R&B

## 2019-08-23 PROCEDURE — 96375 TX/PRO/DX INJ NEW DRUG ADDON: CPT

## 2019-08-23 RX ORDER — IPRATROPIUM BROMIDE AND ALBUTEROL SULFATE 2.5; .5 MG/3ML; MG/3ML
1 SOLUTION RESPIRATORY (INHALATION) ONCE
Status: DISCONTINUED | OUTPATIENT
Start: 2019-08-23 | End: 2019-08-24 | Stop reason: HOSPADM

## 2019-08-23 RX ORDER — ACETAMINOPHEN 325 MG/1
650 TABLET ORAL EVERY 4 HOURS PRN
Status: DISCONTINUED | OUTPATIENT
Start: 2019-08-23 | End: 2019-08-24 | Stop reason: HOSPADM

## 2019-08-23 RX ORDER — IPRATROPIUM BROMIDE AND ALBUTEROL SULFATE 2.5; .5 MG/3ML; MG/3ML
1 SOLUTION RESPIRATORY (INHALATION) EVERY 4 HOURS
Status: DISCONTINUED | OUTPATIENT
Start: 2019-08-23 | End: 2019-08-24 | Stop reason: HOSPADM

## 2019-08-23 RX ORDER — IPRATROPIUM BROMIDE AND ALBUTEROL SULFATE 2.5; .5 MG/3ML; MG/3ML
1 SOLUTION RESPIRATORY (INHALATION) ONCE
Status: COMPLETED | OUTPATIENT
Start: 2019-08-23 | End: 2019-08-23

## 2019-08-23 RX ORDER — SODIUM CHLORIDE 0.9 % (FLUSH) 0.9 %
10 SYRINGE (ML) INJECTION PRN
Status: DISCONTINUED | OUTPATIENT
Start: 2019-08-23 | End: 2019-08-24 | Stop reason: HOSPADM

## 2019-08-23 RX ORDER — METHYLPREDNISOLONE SODIUM SUCCINATE 125 MG/2ML
80 INJECTION, POWDER, LYOPHILIZED, FOR SOLUTION INTRAMUSCULAR; INTRAVENOUS EVERY 8 HOURS
Status: DISCONTINUED | OUTPATIENT
Start: 2019-08-23 | End: 2019-08-24 | Stop reason: HOSPADM

## 2019-08-23 RX ORDER — BRIMONIDINE TARTRATE 2 MG/ML
1 SOLUTION/ DROPS OPHTHALMIC 2 TIMES DAILY
COMMUNITY

## 2019-08-23 RX ORDER — BRIMONIDINE TARTRATE 2 MG/ML
1 SOLUTION/ DROPS OPHTHALMIC EVERY 8 HOURS SCHEDULED
Status: DISCONTINUED | OUTPATIENT
Start: 2019-08-23 | End: 2019-08-23

## 2019-08-23 RX ORDER — GUAIFENESIN 600 MG/1
600 TABLET, EXTENDED RELEASE ORAL 2 TIMES DAILY
Status: DISCONTINUED | OUTPATIENT
Start: 2019-08-23 | End: 2019-08-24 | Stop reason: HOSPADM

## 2019-08-23 RX ORDER — AMLODIPINE BESYLATE 5 MG/1
5 TABLET ORAL DAILY
Status: DISCONTINUED | OUTPATIENT
Start: 2019-08-23 | End: 2019-08-24 | Stop reason: HOSPADM

## 2019-08-23 RX ORDER — DORZOLAMIDE HCL 20 MG/ML
2 SOLUTION/ DROPS OPHTHALMIC 3 TIMES DAILY
Status: DISCONTINUED | OUTPATIENT
Start: 2019-08-23 | End: 2019-08-23

## 2019-08-23 RX ORDER — BRIMONIDINE TARTRATE 2 MG/ML
1 SOLUTION/ DROPS OPHTHALMIC 2 TIMES DAILY
Status: DISCONTINUED | OUTPATIENT
Start: 2019-08-23 | End: 2019-08-24 | Stop reason: HOSPADM

## 2019-08-23 RX ORDER — ONDANSETRON 2 MG/ML
4 INJECTION INTRAMUSCULAR; INTRAVENOUS EVERY 6 HOURS PRN
Status: DISCONTINUED | OUTPATIENT
Start: 2019-08-23 | End: 2019-08-24 | Stop reason: HOSPADM

## 2019-08-23 RX ORDER — METHYLPREDNISOLONE SODIUM SUCCINATE 125 MG/2ML
125 INJECTION, POWDER, LYOPHILIZED, FOR SOLUTION INTRAMUSCULAR; INTRAVENOUS ONCE
Status: COMPLETED | OUTPATIENT
Start: 2019-08-23 | End: 2019-08-23

## 2019-08-23 RX ORDER — DORZOLAMIDE HCL 20 MG/ML
2 SOLUTION/ DROPS OPHTHALMIC 2 TIMES DAILY
Status: DISCONTINUED | OUTPATIENT
Start: 2019-08-23 | End: 2019-08-24 | Stop reason: HOSPADM

## 2019-08-23 RX ORDER — SODIUM CHLORIDE 0.9 % (FLUSH) 0.9 %
10 SYRINGE (ML) INJECTION EVERY 12 HOURS SCHEDULED
Status: DISCONTINUED | OUTPATIENT
Start: 2019-08-23 | End: 2019-08-24 | Stop reason: HOSPADM

## 2019-08-23 RX ORDER — PANTOPRAZOLE SODIUM 20 MG/1
20 TABLET, DELAYED RELEASE ORAL
Status: DISCONTINUED | OUTPATIENT
Start: 2019-08-23 | End: 2019-08-24 | Stop reason: HOSPADM

## 2019-08-23 RX ORDER — FUROSEMIDE 20 MG/1
20 TABLET ORAL 2 TIMES DAILY
Status: DISCONTINUED | OUTPATIENT
Start: 2019-08-23 | End: 2019-08-24 | Stop reason: HOSPADM

## 2019-08-23 RX ADMIN — GUAIFENESIN 600 MG: 600 TABLET, EXTENDED RELEASE ORAL at 11:26

## 2019-08-23 RX ADMIN — IOPAMIDOL 90 ML: 755 INJECTION, SOLUTION INTRAVENOUS at 14:19

## 2019-08-23 RX ADMIN — PANTOPRAZOLE SODIUM 20 MG: 20 TABLET, DELAYED RELEASE ORAL at 11:26

## 2019-08-23 RX ADMIN — METHYLPREDNISOLONE SODIUM SUCCINATE 125 MG: 125 INJECTION, POWDER, FOR SOLUTION INTRAMUSCULAR; INTRAVENOUS at 05:37

## 2019-08-23 RX ADMIN — METHYLPREDNISOLONE SODIUM SUCCINATE 80 MG: 125 INJECTION, POWDER, FOR SOLUTION INTRAMUSCULAR; INTRAVENOUS at 17:46

## 2019-08-23 RX ADMIN — ENOXAPARIN SODIUM 40 MG: 40 INJECTION SUBCUTANEOUS at 11:26

## 2019-08-23 RX ADMIN — FUROSEMIDE 20 MG: 20 TABLET ORAL at 11:26

## 2019-08-23 RX ADMIN — Medication 10 ML: at 20:24

## 2019-08-23 RX ADMIN — CEFTRIAXONE 1 G: 1 INJECTION, POWDER, FOR SOLUTION INTRAMUSCULAR; INTRAVENOUS at 06:05

## 2019-08-23 RX ADMIN — Medication 10 ML: at 11:34

## 2019-08-23 RX ADMIN — IPRATROPIUM BROMIDE AND ALBUTEROL SULFATE 3 ML: .5; 3 SOLUTION RESPIRATORY (INHALATION) at 11:36

## 2019-08-23 RX ADMIN — METHYLPREDNISOLONE SODIUM SUCCINATE 80 MG: 125 INJECTION, POWDER, FOR SOLUTION INTRAMUSCULAR; INTRAVENOUS at 11:26

## 2019-08-23 RX ADMIN — IPRATROPIUM BROMIDE AND ALBUTEROL SULFATE 3 ML: .5; 3 SOLUTION RESPIRATORY (INHALATION) at 15:34

## 2019-08-23 RX ADMIN — IPRATROPIUM BROMIDE AND ALBUTEROL SULFATE 3 ML: .5; 3 SOLUTION RESPIRATORY (INHALATION) at 22:19

## 2019-08-23 RX ADMIN — AMLODIPINE BESYLATE 5 MG: 5 TABLET ORAL at 11:26

## 2019-08-23 RX ADMIN — GUAIFENESIN 600 MG: 600 TABLET, EXTENDED RELEASE ORAL at 20:24

## 2019-08-23 RX ADMIN — IPRATROPIUM BROMIDE AND ALBUTEROL SULFATE 1 AMPULE: .5; 3 SOLUTION RESPIRATORY (INHALATION) at 06:19

## 2019-08-23 RX ADMIN — IPRATROPIUM BROMIDE AND ALBUTEROL SULFATE 3 ML: .5; 3 SOLUTION RESPIRATORY (INHALATION) at 18:34

## 2019-08-23 RX ADMIN — CEFTRIAXONE 1 G: 1 INJECTION, POWDER, FOR SOLUTION INTRAMUSCULAR; INTRAVENOUS at 17:46

## 2019-08-23 RX ADMIN — BRIMONIDINE TARTRATE 1 DROP: 2 SOLUTION/ DROPS OPHTHALMIC at 20:24

## 2019-08-23 RX ADMIN — FUROSEMIDE 20 MG: 20 TABLET ORAL at 17:46

## 2019-08-23 RX ADMIN — DORZOLAMIDE HYDROCHLORIDE 2 DROP: 20 SOLUTION/ DROPS OPHTHALMIC at 20:24

## 2019-08-23 ASSESSMENT — PAIN SCALES - GENERAL: PAINLEVEL_OUTOF10: 0

## 2019-08-23 ASSESSMENT — ENCOUNTER SYMPTOMS
COUGH: 1
RHINORRHEA: 0
DIARRHEA: 0
VOMITING: 0
BACK PAIN: 0
WHEEZING: 1
NAUSEA: 0
SHORTNESS OF BREATH: 1
SORE THROAT: 0
ABDOMINAL PAIN: 0

## 2019-08-23 NOTE — ED PROVIDER NOTES
[facility] EMERGENCY DEPT  eMERGENCY dEPARTMENT eNCOUnter      Pt Name: Gualberto Oliver  MRN: [id number]  [place] 4/2/1924  Date of evaluation: 8/23/2019  Provider: Alfonso Huertas MD    [address] [place] [address]       Chief Complaint   Patient presents with    Shortness of Breath         HISTORY OF PRESENT ILLNESS   (Location/Symptom, Timing/Onset,Context/Setting, Quality, Duration, Modifying Factors, Severity)  Note limiting factors. Gualberto Oliver is a 80 y.o. female who presents to the emergency department for concern of increased shortness of breath over the past 1 day. Patient denies any chest pain. Mild cough nonproductive. Denies any lower extremity edema. She wears 2 L nasal cannula as needed. She was given 2 nebulizer treatments at [facility] nursing home and then [facility] for further evaluation and concern of respiratory distress. EMS provided her another DuoNeb in route. No fevers or chills. DNR/DNI. HPI    NursingNotes were reviewed. REVIEW OF SYSTEMS    (2-9 systems for level 4, 10 or more for level 5)     Review of Systems   Constitutional: Negative for chills and fever. HENT: Negative for rhinorrhea and sore throat. Respiratory: Positive for cough, shortness of breath and wheezing. Cardiovascular: Negative for chest pain and leg swelling. Gastrointestinal: Negative for abdominal pain, diarrhea, nausea and vomiting. Genitourinary: Negative for dysuria, frequency and urgency. Musculoskeletal: Negative for back pain and neck pain. Neurological: Negative for dizziness and headaches. All other systems reviewed and are negative.            PAST MEDICALHISTORY     Past Medical History:   Diagnosis Date    Anemia     Arthritis     Back pain     COPD (chronic obstructive pulmonary disease) [address] [address]     Glaucoma     Hypertension     Lumbar disc disease     Movement disorder     Palliative care patient 04/03/2019    Pneumonia     Popliteal nerve injury     Reflux esophagitis SURGICAL HISTORY       Past Surgical History:   Procedure Laterality Date    BREAST BIOPSY      EYE SURGERY           CURRENT MEDICATIONS     Previous Medications    ALBUTEROL-IPRATROPIUM (COMBIVENT RESPIMAT)  MCG/ACT AERS INHALER    Inhale 1 puff into the lungs every 6 hours    AMLODIPINE (NORVASC) 5 MG TABLET    Take 5 mg by mouth daily    BUDESONIDE-FORMOTEROL (SYMBICORT) 80-4.5 MCG/ACT AERO    Inhale 2 puffs into the lungs 2 times daily    DORZOLAMIDE (TRUSOPT) 2 % OPHTHALMIC SOLUTION    Place 2 drops into both eyes 3 times daily    FUROSEMIDE (LASIX) 20 MG TABLET    Take 20 mg by mouth 2 times daily    GUAIFENESIN (MUCINEX) 600 MG EXTENDED RELEASE TABLET    Take 1 tablet by mouth 2 times daily    IPRATROPIUM-ALBUTEROL (DUONEB) 0.5-2.5 (3) MG/3ML SOLN NEBULIZER SOLUTION    Inhale 1 vial into the lungs every 4 hours    LATANOPROST (XALATAN) 0.005 % OPHTHALMIC SOLUTION    Place 1 drop into both eyes 2 times daily    MULTIPLE VITAMINS-MINERALS (THERAPEUTIC MULTIVITAMIN-MINERALS W/ IRON) TABS TABLET    Take 1 tablet by mouth daily    OMEPRAZOLE (PRILOSEC) 20 MG DELAYED RELEASE CAPSULE    Take 20 mg by mouth daily       ALLERGIES     Patient has no known allergies. FAMILY HISTORY       Family History   Problem Relation Age of Onset    Heart Disease Mother     Heart Attack Mother     Prostate Cancer Father           SOCIAL HISTORY       Social History     Socioeconomic History    Marital status:       Spouse name: None    Number of children: None    Years of education: None    Highest education level: None   Occupational History    None   Social Needs    Financial resource strain: None    Food insecurity:     Worry: None     Inability: None    Transportation needs:     Medical: None     Non-medical: None   Tobacco Use    Smoking status: Never Smoker    Smokeless tobacco: Never Used   Substance and Sexual Activity    Alcohol use: Never     Frequency: Never    Drug use: Never    is 4. GCS verbal subscore is 5. GCS motor subscore is 6. Skin: Skin is warm and dry. Vitals reviewed. DIAGNOSTIC RESULTS     EKG: All EKG's areinterpreted by the Emergency Department Physician who either signs or Co-signs this chart in the absence of a cardiologist.    106 sinus tachycardia, no obvious ST changes, nondiagnostic EKG    RADIOLOGY:  Non-plain film images such as CT, Ultrasound and MRI are read by the radiologist. Plain radiographic images are visualized and preliminarily interpreted bythe emergency physician with the below findings:      XR CHEST PORTABLE    (Results Pending)           LABS:  Labs Reviewed   APTT - Abnormal; Notable for the following components:       Result Value    aPTT 25.2 (*)     All other components within normal limits   CBC WITH AUTO DIFFERENTIAL - Abnormal; Notable for the following components:    MCHC 31.7 (*)     Lymphocytes % 19.3 (*)     Eosinophils % 9.9 (*)     Eosinophils # 0.70 (*)     All other components within normal limits   COMPREHENSIVE METABOLIC PANEL - Abnormal; Notable for the following components:    Glucose 130 (*)     All other components within normal limits   BLOOD GAS, ARTERIAL - Abnormal; Notable for the following components:    pO2, Arterial 58.0 (*)     HCO3, Arterial 30.6 (*)     Base Excess, Arterial 5.8 (*)     All other components within normal limits   RAPID INFLUENZA A/B ANTIGENS   PROTIME-INR   TROPONIN   BRAIN NATRIURETIC PEPTIDE   POTASSIUM, WHOLE BLOOD       All other labs were within normal range or not returned as of this dictation.     EMERGENCY DEPARTMENT COURSE and DIFFERENTIAL DIAGNOSIS/MDM:   Vitals:    Vitals:    08/23/19 0511 08/23/19 0518 08/23/19 0525 08/23/19 0531   BP: (!) 157/78   (!) 144/81   Pulse: 109 108 105 100   Resp: 20 22 21 25   Temp: 98.4 °F (36.9 °C)      SpO2: 93% (!) 89% 92% 95%   Weight: 108 lb (49 kg)          MDM  Number of Diagnoses or Management Options     Amount and/or Complexity of Data

## 2019-08-23 NOTE — CARE COORDINATION
8/23/19: Pt is from [place].   [place]   F  Electronically signed by JA Bianchi on 8/23/2019 at 4:42 PM

## 2019-08-23 NOTE — PROGRESS NOTES
4 Eyes Skin Assessment    Hudson Palma is being assessed upon: Admission    I agree that Evelina Camp, along with Dmitry Harrison (either 2 RN's or 1 LPN and 1 RN) have performed a thorough Head to Toe Skin Assessment on the patient. ALL assessment sites listed below have been assessed. Areas assessed by both nurses:     [x]   Head, Face, and Ears   [x]   Shoulders, Back, and Chest  [x]   Arms, Elbows, and Hands   [x]   Coccyx, Sacrum, and Ischium  [x]   Legs, Feet, and Heels    Does the Patient have Skin Breakdown?  No    Andrae Prevention initiated: Yes  Wound Care Orders initiated: NA    [facility] nurse consulted for Pressure Injury (Stage 3,4, Unstageable, DTI, NWPT, and Complex wounds) and New or Established Ostomies: NA        Primary Nurse eSignature: Obi Raymond RN on 8/23/2019 at 9:15 AM      Co-Signer eSignature: Electronically signed by Dmitry Harrison RN on 8/23/19 at 9:18 AM

## 2019-08-23 NOTE — H&P
[facility] Medicine  History and Physical    Patient:  Sadi Jenkins  MRN: [id number]    CHIEF COMPLAINT:      History Obtained From: Patient  Family present for interview:     PCP: Samia Dietz    HISTORY OF PRESENT ILLNESS:  80 y.o. female who presents with shortness of breath and wheezing x2 days. She also complains of a posterior cervical rash left side x1 day that \"itches\". She had mild hypoxia on admission to the emergency department with an SPO2 of 88%. On exam she has inspiratory wheezes and decreased breath sounds throughout all lung fields. She was treated with 4 rounds of DuoNeb treatments, high-dose steroids and supplemental oxygen in the emergency department but continued to have wheezing and hypoxia off supplemental oxygen and so is admitted to the medicine service for further treatment. She is a past medical history for COPD however she has no history of tobacco abuse and has not lived with a smoker. Patient is admitted to the medicine floor for further evaluation and treatment of shortness of breath, wheezing and possible shingles outbreak posterior left cervical area      REVIEW OF SYSTEMS:  Review of Systems  She had one episode of diarrhea within the last 5 days but denies nausea, vomiting, melena, fevers, chills,rigors. She uses supplemental oxygen as needed and has a history of COPD but does not know why she was diagnosed with COPD. She also complains of a nonproductive cough. She complains of right calf discomfort. 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 All other 14 review of systems negative except as noted above.     Past Medical History:      Diagnosis Date    Anemia     Arthritis     Back pain     COPD (chronic obstructive pulmonary disease) ([facility] [address]     Glaucoma     Hypertension     Lumbar disc disease     Movement disorder     Palliative care patient 04/03/2019    Pneumonia CREATININE 0.5  --    GLUCOSE 130*  --      CMP:   Recent Labs     19     --    K 3.7 3.4     --    CO2 27  --    BUN 16  --    CREATININE 0.5  --    GLUCOSE 130*  --    CALCIUM 9.3  --    BILITOT 0.3  --    ALKPHOS 97  --    AST 14  --    ALT 10  --      Hepatic:   Recent Labs     19   AST 14   ALT 10   BILITOT 0.3   ALKPHOS 97       Lactid Acid:  No results for input(s): LACTA in the last 72 hours. Procalcitonin:     Troponin T:   Recent Labs     19   TROPONINI <0.01     Pro-BNP: No results for input(s): BNP in the last 72 hours. Lipids: No results for input(s): CHOL, HDL in the last 72 hours. Invalid input(s): LDLCALCU  INR:   Recent Labs     19   INR 0.95     A1c:Invalid input(s): HEMOGLOBIN A1C    SEPSIS Criteria:   Temp: Temp  Av.4 °F (36.9 °C)  Min: 98.4 °F (36.9 °C)  Max: 98.4 °F (36.9 °C)    HR Range: Pulse  Av.3  Min: 92  Max: 109   RR Range: Resp  Av.6  Min: 17  Max: 25   WBC:   Recent Labs     19   WBC 7.4      Lactic acid: No results for input(s): LACTA in the last 72 hours. Creatinine:   Recent Labs     19   CREATININE 0.5      Troponin:   Recent Labs     19   TROPONINI <0.01       LFTs:   Recent Labs     19   AST 14   ALT 10   BILITOT 0.3   ALKPHOS 97       ABGs:   Recent Labs     19   PHART 7.450   CJJ3QWH 44.0   PO2ART 58.0*   UNM1HME 30.6*   BEART 5.8*   HGBAE 13.4   K3FMWBBH 91.6   CARBOXHGBART 2.1   02THERAPY Unknown           Urine drug screen: No results for input(s): AMPHSUR, BARBSCNU, LABBENZ, LABMETH, OPIAU, PHENCYCU, PPXUR, OXTCOSU, METAMPU, MDMA, BUPRENUR in the last 72 hours. Invalid input(s): COCAINEMETABOLITE, URINE    Cultures:   No results for input(s): BC in the last 72 hours. No results for input(s): Bretta Chad in the last 72 hours. No results for input(s): CULTRESP in the last 72 hours.   No results for input(s): Juan Chambers in the last 72 hours. -----------------------------------------------------------------    Imaging Studies:    XR CHEST PORTABLE   Final Result   1. No radiographic evidence of acute cardiopulmonary process. Signed by Dr Myranda Shaffer on 8/23/2019 7:12 AM          Narrative   History:   80year-old with hypoxia and shortness of breath. Reference:   CTA chest January 2014   Technique: Following the administration of intravenous contrast, helical   acquisition was obtained from the thoracic inlet through the diaphragm   during the arterial phase. Multiplanar reconstructed images including   3D MIP were obtained. For this CT exam, one or more of the following dose reduction   techniques was employed:   -automated exposure control   -mA and/or kVp adjustment for patient size   -iterative reconstruction    mGy-cm   Findings:   Vascular findings:   No pulmonary embolus. Dilatation Central pulmonary arteries suggest   chronic pulmonary artery hypertension. There is moderate   atherosclerosis of the thoracic aorta without aneurysm or dissection. Mild cardiomegaly. Coronary artery atherosclerotic calcifications. No   pericardial effusion. Extensive coronary artery atherosclerotic   calcifications. Nonvascular findings:   Small thyroid nodules. No lymphadenopathy. No consolidation or edema. Calcified granuloma left base. No   significant noncalcified nodules. Minimal areas of scarring in the   lung parenchyma. No pleural effusion or pneumothorax. Imaging through the upper abdomen shows multiple small gallstones. Benign right renal cyst is decreased in size from 2014. 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 There is a severe thoracolumbar scoliosis. No acute fractures.       Impression   1. No acute findings. No pulmonary embolus. No thoracic aortic   dissection. 2. Incidental cholelithiasis.    Signed by Dr Demetrice Li on 4/2/2019 8:38 AM         I have personally reviewed the following images:    PA/lat CXR: No acute intrathoracic process. EKG: Pending at the time of this dictation. Assessment     Principal Problem:    Acute hypoxemic respiratory failure (HCC)  Active Problems:    Leg pain    COPD exacerbation (HCC)    Rash  Resolved Problems:    * No resolved hospital problems. *      Plan     --Acute hypoxemic respiratory failure. Differential diagnosis includes COPD exacerbation, pulmonary embolus, systolic heart failure, diastolic heart failure. Admit to the medicine floor. Until further work-up is completed continue treatment for presumed COPD exacerbation with high-dose steroids, supplemental oxygen, DuoNeb treatments every 4 hours and ceftriaxone 1 g IV antibiotics. Further work-up of respiratory failure is pending including a BNP, d-dimer. Consider pulmonary function tests as well once acute symptoms have resolved. --Right calf pain. Aborted bilateral lower extremity Doppler ultrasound to evaluate for DVT. Patient has sedentary lifestyle and moves around with a walker and a wheelchair. --History of COPD but no history of tobacco abuse, toxic exposures or smokers at home. Consider chest CT without contrast if d-dimer is negative and chest CT with IV contrast if d-dimer is abnormal to further evaluate etiology of her lung disease. Her last chest CT was performed in 4/2019 (see above) which showed extensive coronary artery calcifications, cardiomegaly but there was no mention of emphysema or COPD in the radiology read. --Rash on posterior left cervical area. Possible shingles outbreak. I have ordered a herpes zoster IgM and IgG to further investigate. -- DVT prophylaxis:  SQ Lovenox      -- CODE STATUS: Prior    Total face-to-face time with this patient, time spent reviewing medical records and in coordination of care with the emergency department physician, nursing staff was 75 minutes.     Signed:  Sheri Velarde MD 8/23/2019 8:04 AM  Admitting Hospitalist

## 2019-08-23 NOTE — PROGRESS NOTES
Austen Tung arrived to room # 328. Presented with: Shortness of Breath  Mental Status: Patient is oriented and alert. Vitals:    08/23/19 0806   BP: (!) 140/72   Pulse: 98   Resp: 16   Temp: 98.1 °F (36.7 °C)   SpO2: 91%     Patient safety contract and falls prevention contract reviewed with patient Yes. Oriented Patient and Family to room. Call light within reach. Yes.   Needs, issues or concerns expressed at this time: no.      Electronically signed by Shari Malin RN on 8/23/2019 at 9:15 AM

## 2019-08-23 NOTE — PROGRESS NOTES
[facility]      Patient:  Zofia Hart  YOB: 1924  Date of Service: 8/23/2019  MRN: [id number]   Acct: [de-identified]   Primary Care Physician: Sami Jaramillo  Advance Directive: Full Code  Admit Date: 8/23/2019       Hospital Day: 0    Chief Complaint:   Chief Complaint   Patient presents with    Shortness of Breath   80 y.o. female who presents with shortness of breath and wheezing x2 days. She also complains of a posterior cervical rash left side x1 day that \"itches\". She had mild hypoxia on admission to the emergency department with an SPO2 of 88%. On exam she has inspiratory wheezes and decreased breath sounds throughout all lung fields.   She was treated with 4 rounds of DuoNeb treatments, high-dose steroids and supplemental oxygen in the emergency department but continued to have wheezing and hypoxia off supplemental oxygen and so is admitted to the medicine service for further treatment.       She is a past medical history for COPD however she has no history of tobacco abuse and has not lived with a smoker.     Patient is admitted to the medicine floor for further evaluation and treatment of shortness of breath, wheezing and possible shingles outbreak posterior left cervical area  8/23/19  H/o COPD admitted for acute hypoxemic resp failure, CXR -ve, BNP -ve, check Dimer +ve , CTA r/o PE , Rt steroids abx   Objective:   VITALS:  BP (!) 140/72   Pulse 98   Temp 98.1 °F (36.7 °C) (Temporal)   Resp 16   Wt 108 lb (49 kg)   SpO2 91%   BMI 17.97 kg/m²   24HR INTAKE/OUTPUT:  No intake or output data in the 24 hours ending 08/23/19 0923   General appearance: alert and cooperative with exam  HEENT: atraumatic, eyes with clear conjunctiva and normal lids  Lungs: no increased work of breathing, diminished breath sounds bilaterally  Heart: S1, S2 normal  Abdomen: soft, non-tender; bowel sounds normal; no masses,  no organomegaly  Extremities:atraumatic, no cyanosis no edema no calf tenderness Neurologic:non focal    Skin: no rashes, nodules. Medications:      cefTRIAXone (ROCEPHIN) IV  1 g Intravenous Q12H    ipratropium-albuterol  1 ampule Inhalation Once    methylPREDNISolone  80 mg Intravenous Q8H    amLODIPine  5 mg Oral Daily    dorzolamide  2 drop Both Eyes TID    furosemide  20 mg Oral BID    guaiFENesin  600 mg Oral BID    ipratropium-albuterol  1 vial Inhalation Q4H    pantoprazole  20 mg Oral QAM AC    sodium chloride flush  10 mL Intravenous 2 times per day    enoxaparin  40 mg Subcutaneous Daily     sodium chloride flush, magnesium hydroxide, ondansetron, acetaminophen  DIET LOW SODIUM 2 GM;         Lab and other Data:     Recent Labs     08/23/19 0519   WBC 7.4   HGB 12.8        Recent Labs     08/23/19 0519 08/23/19 0525     --    K 3.7 3.4     --    CO2 27  --    BUN 16  --    CREATININE 0.5  --    GLUCOSE 130*  --      Recent Labs     08/23/19 0519   AST 14   ALT 10   BILITOT 0.3   ALKPHOS 97     Troponin T:   Recent Labs     08/23/19 0519   TROPONINI <0.01     Pro-BNP: No results for input(s): BNP in the last 72 hours. INR:   Recent Labs     08/23/19 0519   INR 0.95     ABGs:   Lab Results   Component Value Date    PHART 7.450 08/23/2019    PO2ART 58.0 08/23/2019    YZV1CLU 44.0 08/23/2019     UA:No results for input(s): NITRITE, COLORU, PHUR, LABCAST, WBCUA, RBCUA, MUCUS, TRICHOMONAS, YEAST, BACTERIA, CLARITYU, SPECGRAV, LEUKOCYTESUR, UROBILINOGEN, BILIRUBINUR, BLOODU, GLUCOSEU, AMORPHOUS in the last 72 hours. Invalid input(s): KETONESU    Imaging:   XR CHEST PORTABLE   Final Result   1. No radiographic evidence of acute cardiopulmonary process. Signed by Dr Jess Swain on 8/23/2019 7:12 AM      VL Extremity Venous Bilateral    (Results Pending)     Micro:   Blood Culture, Routine   Date Value Ref Range Status   04/02/2019 No growth after 5 days of incubation.   Final   , No components found for: LABURINE  Patient Active Problem List Diagnosis Date Noted    Rash 08/23/2019    Respiratory failure with hypoxia ([facility] [address] 08/23/2019    Palliative care patient 04/03/2019    Hypoxemia 04/02/2019    Leg pain 04/02/2019    Acute hypoxemic respiratory failure (HCC) 04/02/2019    COPD exacerbation ([facility] [address] 04/02/2019    Essential hypertension 04/02/2019       Assessment/plan:   Principal Problem:    Acute hypoxemic respiratory failure (HCC)  Active Problems:    Leg pain    COPD exacerbation (HCC)    Rash    Respiratory failure with hypoxia (HCC)  Resolved Problems:    * No resolved hospital problems. *      Plan:     Acute hypoxemic respiratory failure. Differential diagnosis includes COPD exacerbation, pulmonary embolus, systolic heart failure, diastolic heart failure. Admit to the medicine floor. Until further work-up is completed continue treatment for presumed COPD exacerbation with high-dose steroids, supplemental oxygen, DuoNeb treatments every 4 hours and ceftriaxone 1 g IV antibiotics. Further work-up of respiratory failure is pending including a BNP, d-dimer. Consider pulmonary function tests as well once acute symptoms have resolved.       --Right calf pain. Aborted bilateral lower extremity Doppler ultrasound to evaluate for DVT. Patient has sedentary lifestyle and moves around with a walker and a wheelchair.     --History of COPD but no history of tobacco abuse, toxic exposures or smokers at home. Consider chest CT without contrast if d-dimer is negative and chest CT with IV contrast if d-dimer is abnormal to further evaluate etiology of her lung disease. Her last chest CT was performed in 4/2019 (see above) which showed extensive coronary artery calcifications, cardiomegaly but there was no mention of emphysema or COPD in the radiology read.     --Rash on posterior left cervical area. Possible shingles outbreak.   I have ordered a herpes zoster IgM and IgG to further investigate.     -- DVT prophylaxis:  SQ Lovenox       -- CODE STATUS: Prior      Anita Griggs MD  Hospitalist Service  8/23/2019  9:23 AM

## 2019-08-23 NOTE — PROGRESS NOTES
Blood Gas, Arterial [[id number]] (Abnormal) Collected: 08/23/19 0525     Specimen: Blood gases Updated: 08/23/19 0525      pH, Arterial 7.450      pCO2, Arterial 44.0 mmHg       pO2, Arterial 58.0 mmHg       HCO3, Arterial 30.6 mmol/L       Base Excess, Arterial 5.8 mmol/L       Hemoglobin, Art, Extended 13.4 g/dL       O2 Sat, Arterial 91.6 %       Carboxyhgb, Arterial 2.1 %       Methemoglobin, Arterial 1.4 %       O2 Content, Arterial 17.2 mL/dL       O2 Therapy Unknown     Potassium, Whole Blood [[id number]] Collected: 08/23/19 0525      Updated: 08/23/19 0525      Potassium, Whole Blood 3.4     Room Air  RR

## 2019-08-23 NOTE — ED NOTES
Dr. Tony Cisneros at the bedside.       Marcella Duarte, [facility]  08/23/19 9109

## 2019-08-24 VITALS
DIASTOLIC BLOOD PRESSURE: 73 MMHG | RESPIRATION RATE: 16 BRPM | BODY MASS INDEX: 17.97 KG/M2 | HEART RATE: 102 BPM | TEMPERATURE: 97.1 F | SYSTOLIC BLOOD PRESSURE: 130 MMHG | OXYGEN SATURATION: 95 % | WEIGHT: 108 LBS

## 2019-08-24 LAB
TROPONIN: <0.01 NG/ML (ref 0–0.03)
TROPONIN: <0.01 NG/ML (ref 0–0.03)

## 2019-08-24 PROCEDURE — 6360000002 HC RX W HCPCS: Performed by: HOSPITALIST

## 2019-08-24 PROCEDURE — 84484 ASSAY OF TROPONIN QUANT: CPT

## 2019-08-24 PROCEDURE — 94761 N-INVAS EAR/PLS OXIMETRY MLT: CPT

## 2019-08-24 PROCEDURE — 36415 COLL VENOUS BLD VENIPUNCTURE: CPT

## 2019-08-24 PROCEDURE — 2700000000 HC OXYGEN THERAPY PER DAY

## 2019-08-24 PROCEDURE — 6370000000 HC RX 637 (ALT 250 FOR IP): Performed by: HOSPITALIST

## 2019-08-24 PROCEDURE — 94640 AIRWAY INHALATION TREATMENT: CPT

## 2019-08-24 PROCEDURE — 2580000003 HC RX 258: Performed by: HOSPITALIST

## 2019-08-24 RX ORDER — GUAIFENESIN 600 MG/1
600 TABLET, EXTENDED RELEASE ORAL 2 TIMES DAILY
Qty: 10 TABLET | Refills: 0 | Status: SHIPPED | OUTPATIENT
Start: 2019-08-24

## 2019-08-24 RX ORDER — METHYLPREDNISOLONE 4 MG/1
TABLET ORAL
Qty: 1 KIT | Refills: 0 | Status: SHIPPED | OUTPATIENT
Start: 2019-08-24 | End: 2019-08-30

## 2019-08-24 RX ORDER — CEFUROXIME AXETIL 250 MG/1
500 TABLET ORAL 2 TIMES DAILY
Qty: 10 TABLET | Refills: 0 | Status: SHIPPED | OUTPATIENT
Start: 2019-08-24 | End: 2019-08-29

## 2019-08-24 RX ADMIN — BRIMONIDINE TARTRATE 1 DROP: 2 SOLUTION/ DROPS OPHTHALMIC at 07:59

## 2019-08-24 RX ADMIN — METHYLPREDNISOLONE SODIUM SUCCINATE 80 MG: 125 INJECTION, POWDER, FOR SOLUTION INTRAMUSCULAR; INTRAVENOUS at 00:42

## 2019-08-24 RX ADMIN — DORZOLAMIDE HYDROCHLORIDE 2 DROP: 20 SOLUTION/ DROPS OPHTHALMIC at 07:59

## 2019-08-24 RX ADMIN — METHYLPREDNISOLONE SODIUM SUCCINATE 80 MG: 125 INJECTION, POWDER, FOR SOLUTION INTRAMUSCULAR; INTRAVENOUS at 07:58

## 2019-08-24 RX ADMIN — IPRATROPIUM BROMIDE AND ALBUTEROL SULFATE 3 ML: .5; 3 SOLUTION RESPIRATORY (INHALATION) at 06:21

## 2019-08-24 RX ADMIN — CEFTRIAXONE 1 G: 1 INJECTION, POWDER, FOR SOLUTION INTRAMUSCULAR; INTRAVENOUS at 06:15

## 2019-08-24 RX ADMIN — AMLODIPINE BESYLATE 5 MG: 5 TABLET ORAL at 07:59

## 2019-08-24 RX ADMIN — IPRATROPIUM BROMIDE AND ALBUTEROL SULFATE 3 ML: .5; 3 SOLUTION RESPIRATORY (INHALATION) at 10:11

## 2019-08-24 RX ADMIN — Medication 10 ML: at 00:42

## 2019-08-24 RX ADMIN — GUAIFENESIN 600 MG: 600 TABLET, EXTENDED RELEASE ORAL at 07:59

## 2019-08-24 RX ADMIN — Medication 10 ML: at 06:15

## 2019-08-24 RX ADMIN — ENOXAPARIN SODIUM 40 MG: 40 INJECTION SUBCUTANEOUS at 07:59

## 2019-08-24 RX ADMIN — FUROSEMIDE 20 MG: 20 TABLET ORAL at 07:59

## 2019-08-24 RX ADMIN — PANTOPRAZOLE SODIUM 20 MG: 20 TABLET, DELAYED RELEASE ORAL at 06:15

## 2019-08-24 RX ADMIN — IPRATROPIUM BROMIDE AND ALBUTEROL SULFATE 3 ML: .5; 3 SOLUTION RESPIRATORY (INHALATION) at 02:10

## 2019-08-24 ASSESSMENT — PAIN SCALES - GENERAL: PAINLEVEL_OUTOF10: 0

## 2019-08-24 NOTE — PROGRESS NOTES
[facility]      Patient:  Leonel Chanel  YOB: 1924  Date of Service: 8/24/2019  MRN: [id number]   Acct: [de-identified]   Primary Care Physician: Vignesh Espraza  Advance Directive: DNR-CC  Admit Date: 8/23/2019       Hospital Day: 1    Chief Complaint:   Chief Complaint   Patient presents with    Shortness of Breath   80 y.o. female who presents with shortness of breath and wheezing x2 days. She also complains of a posterior cervical rash left side x1 day that \"itches\". She had mild hypoxia on admission to the emergency department with an SPO2 of 88%. On exam she has inspiratory wheezes and decreased breath sounds throughout all lung fields.   She was treated with 4 rounds of DuoNeb treatments, high-dose steroids and supplemental oxygen in the emergency department but continued to have wheezing and hypoxia off supplemental oxygen and so is admitted to the medicine service for further treatment.       She is a past medical history for COPD however she has no history of tobacco abuse and has not lived with a smoker.     Patient is admitted to the medicine floor for further evaluation and treatment of shortness of breath, wheezing and possible shingles outbreak posterior left cervical area  8/23/19  H/o COPD admitted for acute hypoxemic resp failure, CXR -ve, BNP -ve, check Dimer +ve , CTA r/o PE , Rt steroids abx   8/24/19  CTA -ve PE, venous doppler -ve, echo %65-70 ,continue steroids RT, abx, was checked for need for home O2 she did not need it   Objective:   VITALS:  /73   Pulse 102   Temp 97.1 °F (36.2 °C) (Temporal)   Resp 16   Wt 108 lb (49 kg)   SpO2 95%   BMI 17.97 kg/m²   24HR INTAKE/OUTPUT:      Intake/Output Summary (Last 24 hours) at 8/24/2019 0899  Last data filed at 8/24/2019 0229  Gross per 24 hour   Intake --   Output 625 ml   Net -625 ml      General appearance: alert and cooperative with exam  HEENT: atraumatic, eyes with clear conjunctiva and normal lids  Lungs: no increased work of breathing, diminished breath sounds bilaterally  Heart: S1, S2 normal  Abdomen: soft, non-tender; bowel sounds normal; no masses,  no organomegaly  Extremities:atraumatic, no cyanosis no edema no calf tenderness   Neurologic:non focal    Skin: no rashes, nodules. Medications:      cefTRIAXone (ROCEPHIN) IV  1 g Intravenous Q12H    ipratropium-albuterol  1 ampule Inhalation Once    methylPREDNISolone  80 mg Intravenous Q8H    amLODIPine  5 mg Oral Daily    furosemide  20 mg Oral BID    guaiFENesin  600 mg Oral BID    ipratropium-albuterol  1 vial Inhalation Q4H    pantoprazole  20 mg Oral QAM AC    sodium chloride flush  10 mL Intravenous 2 times per day    enoxaparin  40 mg Subcutaneous Daily    brimonidine  1 drop Both Eyes BID    dorzolamide  2 drop Both Eyes BID     sodium chloride flush, magnesium hydroxide, ondansetron, acetaminophen  DIET LOW SODIUM 2 GM;         Lab and other Data:     Recent Labs     08/23/19 0519   WBC 7.4   HGB 12.8        Recent Labs     08/23/19  0519 08/23/19  0525     --    K 3.7 3.4     --    CO2 27  --    BUN 16  --    CREATININE 0.5  --    GLUCOSE 130*  --      Recent Labs     08/23/19 0519   AST 14   ALT 10   BILITOT 0.3   ALKPHOS 97     Troponin T:   Recent Labs     08/23/19  1437 08/23/19  1947 08/24/19  0247   TROPONINI <0.01 <0.01 <0.01     Pro-BNP: No results for input(s): BNP in the last 72 hours. INR:   Recent Labs     08/23/19 0519   INR 0.95     ABGs:   Lab Results   Component Value Date    PHART 7.450 08/23/2019    PO2ART 58.0 08/23/2019    HEK6BZU 44.0 08/23/2019     UA:No results for input(s): NITRITE, COLORU, PHUR, LABCAST, WBCUA, RBCUA, MUCUS, TRICHOMONAS, YEAST, BACTERIA, CLARITYU, SPECGRAV, LEUKOCYTESUR, UROBILINOGEN, BILIRUBINUR, BLOODU, GLUCOSEU, AMORPHOUS in the last 72 hours. Invalid input(s): KETONESU    Imaging:   CTA PULMONARY W CONTRAST   Final Result   No evidence of pulmonary embolism. Pulmonary arterial hypertension. Other findings as above. Signed by Dr Yuri Adame on 8/23/2019 4:14 PM      VL Extremity Venous Bilateral   Final Result      XR CHEST PORTABLE   Final Result   1. No radiographic evidence of acute cardiopulmonary process. Signed by Dr Soumya Pratt on 8/23/2019 7:12 AM        Micro:   Blood Culture, Routine   Date Value Ref Range Status   04/02/2019 No growth after 5 days of incubation. Final   , No components found for: LABURINE  Patient Active Problem List    Diagnosis Date Noted    Rash 08/23/2019    Respiratory failure with hypoxia ([address] 08/23/2019    Palliative care patient 04/03/2019    Hypoxemia 04/02/2019    Leg pain 04/02/2019    Acute hypoxemic respiratory failure ([address] 04/02/2019    COPD exacerbation ([facility] [address] 04/02/2019    Essential hypertension 04/02/2019       Assessment/plan:   Principal Problem:    Acute hypoxemic respiratory failure (HCC)  Active Problems:    Leg pain    COPD exacerbation (HCC)    Rash    Respiratory failure with hypoxia (HCC)  Resolved Problems:    * No resolved hospital problems. *      Plan:     Acute hypoxemic respiratory failure. Differential diagnosis includes COPD exacerbation, pulmonary embolus, systolic heart failure, diastolic heart failure. Admit to the medicine floor. Until further work-up is completed continue treatment for presumed COPD exacerbation with high-dose steroids, supplemental oxygen, DuoNeb treatments every 4 hours and ceftriaxone 1 g IV antibiotics. Further work-up of respiratory failure is pending including a BNP, d-dimer. Consider pulmonary function tests as well once acute symptoms have resolved.       --Right calf pain. Aborted bilateral lower extremity Doppler ultrasound to evaluate for DVT. Patient has sedentary lifestyle and moves around with a walker and a wheelchair.     --History of COPD but no history of tobacco abuse, toxic exposures or smokers at home.   Consider chest CT without contrast if d-dimer is negative and chest CT with IV contrast if d-dimer is abnormal to further evaluate etiology of her lung disease. Her last chest CT was performed in 4/2019 (see above) which showed extensive coronary artery calcifications, cardiomegaly but there was no mention of emphysema or COPD in the radiology read.     --Rash on posterior left cervical area. Possible shingles outbreak.   I have ordered a herpes zoster IgM and IgG to further investigate.     -- DVT prophylaxis:  SQ Lovenox       -- CODE STATUS: Prior      Celia Paget, MD  Hospitalist Service  8/24/2019  8:41 AM

## 2019-08-24 NOTE — DISCHARGE SUMMARY
Hospitalist   Discharge Summary    Vandana Tavares  :  1924  MRN:  [id number]    Admit date:  2019  Discharge date:  2019    Admitting Physician:  Rosi Luis MD    Advance Directive: DNR-CC    Consults: none     Primary Care Physician:  Reno Rowland    Discharge Diagnoses:  Principal Problem:    Acute hypoxemic respiratory failure ([address]  Active Problems:    Leg pain    COPD exacerbation ([address]    Rash    Respiratory failure with hypoxia ([address]  Resolved Problems:    * No resolved hospital problems. *      Significant Diagnostic Studies:   Xr Chest Portable    Result Date: 2019  EXAMINATION: XR CHEST PORTABLE 2019 7:11 AM HISTORY: XR CHEST PORTABLE 2019 4:15 AM HISTORY: Short of breath COMPARISON: 2019. FINDINGS: The lungs are clear. The cardiomediastinal silhouette and pulmonary vascularity are within normal limits. The osseous structures and surrounding soft tissues demonstrate no acute abnormality. Mild scoliosis thoracic spine is noted    1. No radiographic evidence of acute cardiopulmonary process. Signed by Dr Tay Delgado on 2019 7:12 AM    Cta Pulmonary W Contrast    Result Date: 2019  Examination. CTA PULMONARY W CONTRAST History: Shortness of breath on exertion. Elevated d-dimer. DLP: 198 mGycm. The CT angiography of the chest is performed after intravenous contrast enhancement. The images are acquired in axial plane with subsequent reconstruction in coronal and sagittal planes. The comparison is made with the previous study dated 2019. There is good opacification of the pulmonary arteries and the branches. There is dilatation of the main pulmonary artery, right and left pulmonary arteries. There is no filling defect in the opacified pulmonary arterial bed. The RV/ LV ratio is 30/35. Atheromatous changes of thoracic aorta are seen. No aneurysmal dilatation or dissection. Atheromatous changes of coronary arteries are seen.  There is no evidence of mediastinal or hilar mass or lymphadenopathy. There is moderate diffuse dilatation of the mid and distal esophagus. Fluid levels. This is unchanged from previous study. The thyroid gland appears prominent, right lobe more than the left. There are several low density nodules more on the right side. There is no axillary lymphadenopathy. The lungs are poorly inflated. No areas of focal consolidation or infiltrate. Discoid atelectatic changes and scarring are seen. There is decreased left lung volume with displacement of the cardiomediastinal structures towards the left. There are emphysematous images changes with large bullae in the right lower lobe anteriorly. The visualized liver and spleen appear unremarkable. Low-density nodule in the upper pole of the left kidney is noted. Another low-density nodule along the lateral margin of the right kidney is noted. These are incompletely visualized and evaluated. The images reviewed in bone window show S-shaped scoliosis of the thoracolumbar spine. No focal bony lesion or bone destruction. The visualized ribs appear unremarkable. No evidence of pulmonary embolism. Pulmonary arterial hypertension. Other findings as above. Signed by Dr Enma Medina on 8/23/2019 4:14 PM    Vl Extremity Venous Bilateral    Result Date: 8/23/2019  Vascular Lower Extremities DVT Study Procedure  Demographics   Patient Name  Sandy Kamara  Age                80                F   Patient       [id number]       Gender             Female  Number   Visit Number  [id number]    Interpreting       Isiah Campbell MD                             Physician   Date of Birth 04/02/1924   Referring          Enid Velasco                             Physician   Accession     [id number]    [address],  Number                                        [facility]  Procedure Type of Study:   Veins:Lower Extremities DVT Study, VL LOWER EXTREMITY BILATERAL VENOUS  DUPLEX .   Indications for Study:Pain, bilateral lower extremity. Impression   Normal venous duplex study of the bilateral lower extremity(ies). There is  no evidence of deep or superficial venous thrombosis. Signature   ----------------------------------------------------------------  Electronically signed by Kaylynn Barraza MD(Interpreting  physician) on 08/23/2019 01:18 PM  ----------------------------------------------------------------  Velocities are measured in cm/s ; Diameters are measured in mm Right Lower Extremities DVT Study Measurements Right 2D Measurements +------------------------------------+----------+---------------+----------+ ! Location                            ! Visualized! Compressibility! Thrombosis! +------------------------------------+----------+---------------+----------+ ! Sapheno Femoral Junction            ! Yes       ! Yes            ! None      ! +------------------------------------+----------+---------------+----------+ ! Common Femoral                      !Yes       ! Yes            ! None      ! +------------------------------------+----------+---------------+----------+ ! Prox Femoral                        !Yes       ! Yes            ! None      ! +------------------------------------+----------+---------------+----------+ ! Mid Femoral                         !Yes       ! Yes            ! None      ! +------------------------------------+----------+---------------+----------+ ! Dist Femoral                        !Yes       ! Yes            ! None      ! +------------------------------------+----------+---------------+----------+ ! Deep Femoral                        !Yes       ! Yes            ! None      ! +------------------------------------+----------+---------------+----------+ ! Popliteal                           !Yes       ! Yes            ! None      ! +------------------------------------+----------+---------------+----------+ ! SSV                                 ! Yes       ! Yes            ! None      ! +------------------------------------+----------+---------------+----------+ ! Gastroc                             ! Yes       ! Yes            ! None      ! +------------------------------------+----------+---------------+----------+ ! PTV                                 ! Yes       ! Yes            ! None      ! +------------------------------------+----------+---------------+----------+ ! GSV                                 ! Yes       ! Yes            ! None      ! +------------------------------------+----------+---------------+----------+ ! ATV                                 ! Yes       ! Yes            ! None      ! +------------------------------------+----------+---------------+----------+ ! Peroneal                            !Yes       ! Yes            ! None      ! +------------------------------------+----------+---------------+----------+ ! Soleal                              !Yes       ! Yes            ! None      ! +------------------------------------+----------+---------------+----------+ Left Lower Extremities DVT Study Measurements Left 2D Measurements +------------------------------------+----------+---------------+----------+ ! Location                            ! Visualized! Compressibility! Thrombosis! +------------------------------------+----------+---------------+----------+ ! Sapheno Femoral Junction            ! Yes       ! Yes            ! None      ! +------------------------------------+----------+---------------+----------+ ! Common Femoral                      !Yes       ! Yes            ! None      ! +------------------------------------+----------+---------------+----------+ ! Prox Femoral                        !Yes       ! Yes            ! None      ! +------------------------------------+----------+---------------+----------+ ! Mid Femoral                         !Yes       ! Yes            ! None      ! +------------------------------------+----------+---------------+----------+ ! Dist Femoral !Yes       !Yes            ! None      ! +------------------------------------+----------+---------------+----------+ ! Deep Femoral                        !Yes       ! Yes            ! None      ! +------------------------------------+----------+---------------+----------+ ! Popliteal                           !Yes       ! Yes            ! None      ! +------------------------------------+----------+---------------+----------+ ! SSV                                 ! Yes       ! Yes            ! None      ! +------------------------------------+----------+---------------+----------+ ! Gastroc                             ! Yes       ! Yes            ! None      ! +------------------------------------+----------+---------------+----------+ ! PTV                                 ! Yes       ! Yes            ! None      ! +------------------------------------+----------+---------------+----------+ ! GSV                                 ! Yes       ! Yes            ! None      ! +------------------------------------+----------+---------------+----------+ ! ATV                                 ! Yes       ! Yes            ! None      ! +------------------------------------+----------+---------------+----------+ ! Peroneal                            !Yes       ! Yes            ! None      ! +------------------------------------+----------+---------------+----------+ ! Soleal                              !Yes       ! Yes            ! None      ! +------------------------------------+----------+---------------+----------+      Labs:    CBC:   Recent Labs     08/23/19 0519   WBC 7.4   HGB 12.8        BMP:    Recent Labs     08/23/19 0519 08/23/19 0525     --    K 3.7 3.4     --    CO2 27  --    BUN 16  --    CREATININE 0.5  --    GLUCOSE 130*  --      Hepatic:   Recent Labs     08/23/19 0519   AST 14   ALT 10   BILITOT 0.3   ALKPHOS 97     Troponin:   Recent Labs     08/23/19  1947 08/24/19  0247 08/24/19  0847   TROPONINI <0.01 <0.01 <0.01     BNP: No results for input(s): BNP in the last 72 hours. Lipids: No results for input(s): CHOL, HDL in the last 72 hours. Invalid input(s): LDLCALCU  INR:   Recent Labs     08/23/19  0519   INR 0.95     ABG:   Recent Labs     08/23/19  0525   PHART 7.450   RPQ5VVI 44.0   PO2ART 58.0*   VUT3FRK 30.6*   P4VKOXMR 91.6   BEART 5.8*       30 Day lookback of cultures:    Blood Culture Recent: No results for input(s): BC in the last 720 hours. Gram Stain Recent: No results for input(s): LABGRAM in the last 720 hours. Resp Culture Recent: No results for input(s): CULTRESP in the last 720 hours. Body Fluid Recent : No results for input(s): BFCX in the last 720 hours. MRSA Recent : No results for input(s): [address] in the last 720 hours. Urine Culture Recent : No results for input(s): LABURIN in the last 720 hours. Organism Recent : No results for input(s): ORG in the last 720 hours. Hospital Course:   80 y.o. female who presents with shortness of breath and wheezing x2 days. She also complains of a posterior cervical rash left side x1 day that \"itches\". She had mild hypoxia on admission to the emergency department with an SPO2 of 88%. On exam she has inspiratory wheezes and decreased breath sounds throughout all lung fields.   She was treated with 4 rounds of DuoNeb treatments, high-dose steroids and supplemental oxygen in the emergency department but continued to have wheezing and hypoxia off supplemental oxygen and so is admitted to the medicine service for further treatment.       She is a past medical history for COPD however she has no history of tobacco abuse and has not lived with a smoker.     Patient is admitted to the medicine floor for further evaluation and treatment of shortness of breath, wheezing and possible shingles outbreak posterior left cervical area  8/23/19  H/o COPD admitted for acute hypoxemic resp failure, CXR -ve, BNP -ve, check Dimer +ve , CTA r/o PE , Rt steroids abx mouth.             Multiple Vitamins-Minerals (THERAPEUTIC MULTIVITAMIN-MINERALS W/ IRON) TABS tablet  Take 1 tablet by mouth daily             omeprazole (PRILOSEC) 20 MG delayed release capsule  Take 20 mg by mouth daily                 Discharge Instructions: Follow up with [facility] in 7days. Take medications as directed. Resume activity as tolerated. Diet: DIET LOW SODIUM 2 GM;     Disposition: Patient is medically stable and will be discharged to NH     Time spent on discharge 45 minutes. Signed:   Maggie Vergara MD  Hospitalist service  8/24/2019  11:42 AM

## 2019-08-25 LAB — VARICELLA ZOSTER AB IGM: 0.11 ISR

## 2019-08-28 LAB — VZV IGG SER QL IA: 4.75

## 2019-10-04 ENCOUNTER — APPOINTMENT (OUTPATIENT)
Dept: GENERAL RADIOLOGY | Age: 84
DRG: 193 | End: 2019-10-04
Payer: MEDICARE

## 2019-10-04 ENCOUNTER — HOSPITAL ENCOUNTER (INPATIENT)
Age: 84
LOS: 3 days | Discharge: NURSING, REHAB OR LONG-TERM CARE | DRG: 193 | End: 2019-10-07
Attending: EMERGENCY MEDICINE | Admitting: HOSPITALIST
Payer: MEDICARE

## 2019-10-04 DIAGNOSIS — J44.1 COPD EXACERBATION (HCC): ICD-10-CM

## 2019-10-04 DIAGNOSIS — J96.21 ACUTE ON CHRONIC RESPIRATORY FAILURE WITH HYPOXIA (HCC): ICD-10-CM

## 2019-10-04 DIAGNOSIS — A41.9 SEPSIS DUE TO PNEUMONIA (HCC): Primary | ICD-10-CM

## 2019-10-04 DIAGNOSIS — J18.9 SEPSIS DUE TO PNEUMONIA (HCC): Primary | ICD-10-CM

## 2019-10-04 DIAGNOSIS — J18.9 PNEUMONIA OF RIGHT LOWER LOBE DUE TO INFECTIOUS ORGANISM: ICD-10-CM

## 2019-10-04 LAB
ANION GAP SERPL CALCULATED.3IONS-SCNC: 12 MMOL/L (ref 7–19)
BANDED NEUTROPHILS RELATIVE PERCENT: 12 % (ref 0–5)
BASE EXCESS ARTERIAL: 3.3 MMOL/L (ref -2–2)
BASOPHILS ABSOLUTE: 0 K/UL (ref 0–0.2)
BASOPHILS RELATIVE PERCENT: 0 % (ref 0–1)
BILIRUBIN URINE: NEGATIVE
BLOOD, URINE: NEGATIVE
BUN BLDV-MCNC: 25 MG/DL (ref 8–23)
CALCIUM SERPL-MCNC: 9.3 MG/DL (ref 8.2–9.6)
CARBOXYHEMOGLOBIN ARTERIAL: 2.8 % (ref 0–5)
CHLORIDE BLD-SCNC: 100 MMOL/L (ref 98–111)
CLARITY: CLEAR
CO2: 27 MMOL/L (ref 22–29)
COLOR: NORMAL
CREAT SERPL-MCNC: 0.9 MG/DL (ref 0.5–0.9)
EOSINOPHILS ABSOLUTE: 0 K/UL (ref 0–0.6)
EOSINOPHILS RELATIVE PERCENT: 0 % (ref 0–5)
GFR NON-AFRICAN AMERICAN: 58
GLUCOSE BLD-MCNC: 98 MG/DL (ref 74–109)
GLUCOSE URINE: NEGATIVE MG/DL
HCO3 ARTERIAL: 28.5 MMOL/L (ref 22–26)
HCT VFR BLD CALC: 37.5 % (ref 37–47)
HEMOGLOBIN, ART, EXTENDED: 11.3 G/DL (ref 12–16)
HEMOGLOBIN: 11.4 G/DL (ref 12–16)
IMMATURE GRANULOCYTES #: 2.7 K/UL
KETONES, URINE: NEGATIVE MG/DL
LACTIC ACID, SEPSIS: 1.6 MG/DL (ref 0.5–1.9)
LEUKOCYTE ESTERASE, URINE: NEGATIVE
LYMPHOCYTES ABSOLUTE: 1.2 K/UL (ref 1.1–4.5)
LYMPHOCYTES RELATIVE PERCENT: 3 % (ref 20–40)
MAGNESIUM: 1.9 MG/DL (ref 1.7–2.3)
MCH RBC QN AUTO: 27 PG (ref 27–31)
MCHC RBC AUTO-ENTMCNC: 30.4 G/DL (ref 33–37)
MCV RBC AUTO: 88.9 FL (ref 81–99)
METHEMOGLOBIN ARTERIAL: 1.5 %
MONOCYTES ABSOLUTE: 1.2 K/UL (ref 0–0.9)
MONOCYTES RELATIVE PERCENT: 3 % (ref 0–10)
NEUTROPHILS ABSOLUTE: 37.5 K/UL (ref 1.5–7.5)
NEUTROPHILS RELATIVE PERCENT: 82 % (ref 50–65)
NITRITE, URINE: NEGATIVE
O2 CONTENT ARTERIAL: 13.9 ML/DL
O2 SAT, ARTERIAL: 87.6 %
O2 THERAPY: ABNORMAL
PCO2 ARTERIAL: 45 MMHG (ref 35–45)
PDW BLD-RTO: 14.8 % (ref 11.5–14.5)
PH ARTERIAL: 7.41 (ref 7.35–7.45)
PH UA: 5.5 (ref 5–8)
PLATELET # BLD: 263 K/UL (ref 130–400)
PLATELET SLIDE REVIEW: ADEQUATE
PMV BLD AUTO: 10.4 FL (ref 9.4–12.3)
PO2 ARTERIAL: 50 MMHG (ref 80–100)
POTASSIUM REFLEX MAGNESIUM: 3.4 MMOL/L (ref 3.5–5)
POTASSIUM, WHOLE BLOOD: 3
PRO-BNP: 3388 PG/ML (ref 0–1800)
PROTEIN UA: NEGATIVE MG/DL
RBC # BLD: 4.22 M/UL (ref 4.2–5.4)
RBC # BLD: NORMAL 10*6/UL
SODIUM BLD-SCNC: 139 MMOL/L (ref 136–145)
SPECIFIC GRAVITY UA: 1.01 (ref 1–1.03)
UROBILINOGEN, URINE: 1 E.U./DL
WBC # BLD: 39.9 K/UL (ref 4.8–10.8)

## 2019-10-04 PROCEDURE — 2700000000 HC OXYGEN THERAPY PER DAY

## 2019-10-04 PROCEDURE — 83735 ASSAY OF MAGNESIUM: CPT

## 2019-10-04 PROCEDURE — 6360000002 HC RX W HCPCS: Performed by: EMERGENCY MEDICINE

## 2019-10-04 PROCEDURE — 36600 WITHDRAWAL OF ARTERIAL BLOOD: CPT

## 2019-10-04 PROCEDURE — 87081 CULTURE SCREEN ONLY: CPT

## 2019-10-04 PROCEDURE — 96365 THER/PROPH/DIAG IV INF INIT: CPT

## 2019-10-04 PROCEDURE — 87040 BLOOD CULTURE FOR BACTERIA: CPT

## 2019-10-04 PROCEDURE — 36415 COLL VENOUS BLD VENIPUNCTURE: CPT

## 2019-10-04 PROCEDURE — 81003 URINALYSIS AUTO W/O SCOPE: CPT

## 2019-10-04 PROCEDURE — 94640 AIRWAY INHALATION TREATMENT: CPT

## 2019-10-04 PROCEDURE — 2580000003 HC RX 258: Performed by: EMERGENCY MEDICINE

## 2019-10-04 PROCEDURE — 82803 BLOOD GASES ANY COMBINATION: CPT

## 2019-10-04 PROCEDURE — 99999 PR OFFICE/OUTPT VISIT,PROCEDURE ONLY: CPT | Performed by: EMERGENCY MEDICINE

## 2019-10-04 PROCEDURE — 83605 ASSAY OF LACTIC ACID: CPT

## 2019-10-04 PROCEDURE — 84132 ASSAY OF SERUM POTASSIUM: CPT

## 2019-10-04 PROCEDURE — 2000000000 HC ICU R&B

## 2019-10-04 PROCEDURE — 93005 ELECTROCARDIOGRAM TRACING: CPT | Performed by: EMERGENCY MEDICINE

## 2019-10-04 PROCEDURE — 80048 BASIC METABOLIC PNL TOTAL CA: CPT

## 2019-10-04 PROCEDURE — 99285 EMERGENCY DEPT VISIT HI MDM: CPT

## 2019-10-04 PROCEDURE — 71046 X-RAY EXAM CHEST 2 VIEWS: CPT

## 2019-10-04 PROCEDURE — 6370000000 HC RX 637 (ALT 250 FOR IP): Performed by: EMERGENCY MEDICINE

## 2019-10-04 PROCEDURE — 2580000003 HC RX 258: Performed by: HOSPITALIST

## 2019-10-04 PROCEDURE — 83880 ASSAY OF NATRIURETIC PEPTIDE: CPT

## 2019-10-04 PROCEDURE — 85025 COMPLETE CBC W/AUTO DIFF WBC: CPT

## 2019-10-04 RX ORDER — BRIMONIDINE TARTRATE 2 MG/ML
1 SOLUTION/ DROPS OPHTHALMIC 2 TIMES DAILY
Status: DISCONTINUED | OUTPATIENT
Start: 2019-10-04 | End: 2019-10-07 | Stop reason: HOSPADM

## 2019-10-04 RX ORDER — METHYLPREDNISOLONE SODIUM SUCCINATE 125 MG/2ML
80 INJECTION, POWDER, LYOPHILIZED, FOR SOLUTION INTRAMUSCULAR; INTRAVENOUS ONCE
Status: COMPLETED | OUTPATIENT
Start: 2019-10-04 | End: 2019-10-04

## 2019-10-04 RX ORDER — SODIUM CHLORIDE 0.9 % (FLUSH) 0.9 %
10 SYRINGE (ML) INJECTION PRN
Status: DISCONTINUED | OUTPATIENT
Start: 2019-10-04 | End: 2019-10-05 | Stop reason: SDUPTHER

## 2019-10-04 RX ORDER — OMEPRAZOLE 20 MG/1
20 CAPSULE, DELAYED RELEASE ORAL DAILY
Status: DISCONTINUED | OUTPATIENT
Start: 2019-10-05 | End: 2019-10-07 | Stop reason: HOSPADM

## 2019-10-04 RX ORDER — MAGNESIUM SULFATE 1 G/100ML
1 INJECTION INTRAVENOUS PRN
Status: DISCONTINUED | OUTPATIENT
Start: 2019-10-04 | End: 2019-10-07 | Stop reason: HOSPADM

## 2019-10-04 RX ORDER — POTASSIUM CHLORIDE 7.45 MG/ML
10 INJECTION INTRAVENOUS PRN
Status: DISCONTINUED | OUTPATIENT
Start: 2019-10-04 | End: 2019-10-05 | Stop reason: SDUPTHER

## 2019-10-04 RX ORDER — LATANOPROST 50 UG/ML
1 SOLUTION/ DROPS OPHTHALMIC 2 TIMES DAILY
Status: DISCONTINUED | OUTPATIENT
Start: 2019-10-05 | End: 2019-10-05

## 2019-10-04 RX ORDER — SODIUM CHLORIDE, SODIUM LACTATE, POTASSIUM CHLORIDE, AND CALCIUM CHLORIDE .6; .31; .03; .02 G/100ML; G/100ML; G/100ML; G/100ML
30 INJECTION, SOLUTION INTRAVENOUS ONCE
Status: COMPLETED | OUTPATIENT
Start: 2019-10-04 | End: 2019-10-05

## 2019-10-04 RX ORDER — POTASSIUM CHLORIDE 7.45 MG/ML
10 INJECTION INTRAVENOUS PRN
Status: DISCONTINUED | OUTPATIENT
Start: 2019-10-04 | End: 2019-10-07 | Stop reason: HOSPADM

## 2019-10-04 RX ORDER — POTASSIUM CHLORIDE 20 MEQ/1
40 TABLET, EXTENDED RELEASE ORAL PRN
Status: DISCONTINUED | OUTPATIENT
Start: 2019-10-04 | End: 2019-10-05 | Stop reason: SDUPTHER

## 2019-10-04 RX ORDER — ACETAMINOPHEN 325 MG/1
650 TABLET ORAL EVERY 4 HOURS PRN
Status: DISCONTINUED | OUTPATIENT
Start: 2019-10-04 | End: 2019-10-07 | Stop reason: HOSPADM

## 2019-10-04 RX ORDER — ACETAMINOPHEN 500 MG
1000 TABLET ORAL EVERY 6 HOURS PRN
COMMUNITY

## 2019-10-04 RX ORDER — METHYLPREDNISOLONE SODIUM SUCCINATE 125 MG/2ML
80 INJECTION, POWDER, LYOPHILIZED, FOR SOLUTION INTRAMUSCULAR; INTRAVENOUS EVERY 6 HOURS
Status: DISCONTINUED | OUTPATIENT
Start: 2019-10-05 | End: 2019-10-07 | Stop reason: HOSPADM

## 2019-10-04 RX ORDER — GUAIFENESIN 600 MG/1
600 TABLET, EXTENDED RELEASE ORAL 2 TIMES DAILY
Status: DISCONTINUED | OUTPATIENT
Start: 2019-10-04 | End: 2019-10-07 | Stop reason: HOSPADM

## 2019-10-04 RX ORDER — SODIUM CHLORIDE 0.9 % (FLUSH) 0.9 %
10 SYRINGE (ML) INJECTION EVERY 12 HOURS SCHEDULED
Status: DISCONTINUED | OUTPATIENT
Start: 2019-10-04 | End: 2019-10-05 | Stop reason: SDUPTHER

## 2019-10-04 RX ORDER — SODIUM CHLORIDE 9 MG/ML
INJECTION, SOLUTION INTRAVENOUS CONTINUOUS
Status: DISCONTINUED | OUTPATIENT
Start: 2019-10-04 | End: 2019-10-07 | Stop reason: HOSPADM

## 2019-10-04 RX ORDER — IPRATROPIUM BROMIDE AND ALBUTEROL SULFATE 2.5; .5 MG/3ML; MG/3ML
1 SOLUTION RESPIRATORY (INHALATION)
Status: DISCONTINUED | OUTPATIENT
Start: 2019-10-05 | End: 2019-10-04

## 2019-10-04 RX ORDER — IPRATROPIUM BROMIDE AND ALBUTEROL SULFATE 2.5; .5 MG/3ML; MG/3ML
1 SOLUTION RESPIRATORY (INHALATION) ONCE
Status: COMPLETED | OUTPATIENT
Start: 2019-10-04 | End: 2019-10-04

## 2019-10-04 RX ORDER — ONDANSETRON 2 MG/ML
4 INJECTION INTRAMUSCULAR; INTRAVENOUS EVERY 6 HOURS PRN
Status: DISCONTINUED | OUTPATIENT
Start: 2019-10-04 | End: 2019-10-07 | Stop reason: HOSPADM

## 2019-10-04 RX ORDER — DORZOLAMIDE HCL 20 MG/ML
2 SOLUTION/ DROPS OPHTHALMIC 3 TIMES DAILY
Status: DISCONTINUED | OUTPATIENT
Start: 2019-10-04 | End: 2019-10-05

## 2019-10-04 RX ADMIN — CEFTRIAXONE 1 G: 1 INJECTION, POWDER, FOR SOLUTION INTRAMUSCULAR; INTRAVENOUS at 20:49

## 2019-10-04 RX ADMIN — SODIUM CHLORIDE: 9 INJECTION, SOLUTION INTRAVENOUS at 23:33

## 2019-10-04 RX ADMIN — SODIUM CHLORIDE, POTASSIUM CHLORIDE, SODIUM LACTATE AND CALCIUM CHLORIDE 1000 ML: 600; 310; 30; 20 INJECTION, SOLUTION INTRAVENOUS at 20:49

## 2019-10-04 RX ADMIN — Medication 10 ML: at 23:34

## 2019-10-04 RX ADMIN — Medication 500 MG: at 20:49

## 2019-10-04 RX ADMIN — METHYLPREDNISOLONE SODIUM SUCCINATE 80 MG: 125 INJECTION, POWDER, FOR SOLUTION INTRAMUSCULAR; INTRAVENOUS at 22:07

## 2019-10-04 RX ADMIN — IPRATROPIUM BROMIDE AND ALBUTEROL SULFATE 1 AMPULE: .5; 3 SOLUTION RESPIRATORY (INHALATION) at 21:11

## 2019-10-04 ASSESSMENT — ENCOUNTER SYMPTOMS
COUGH: 1
RHINORRHEA: 0
WHEEZING: 1
VOICE CHANGE: 0
DIARRHEA: 0
SHORTNESS OF BREATH: 1
ABDOMINAL PAIN: 0
EYE REDNESS: 0
EYE PAIN: 0
VOMITING: 0

## 2019-10-05 LAB
ANION GAP SERPL CALCULATED.3IONS-SCNC: 13 MMOL/L (ref 7–19)
BUN BLDV-MCNC: 25 MG/DL (ref 8–23)
CALCIUM SERPL-MCNC: 8.9 MG/DL (ref 8.2–9.6)
CHLORIDE BLD-SCNC: 98 MMOL/L (ref 98–111)
CO2: 26 MMOL/L (ref 22–29)
CREAT SERPL-MCNC: 0.9 MG/DL (ref 0.5–0.9)
GFR NON-AFRICAN AMERICAN: 58
GLUCOSE BLD-MCNC: 189 MG/DL (ref 74–109)
HCT VFR BLD CALC: 31.3 % (ref 37–47)
HEMOGLOBIN: 9.7 G/DL (ref 12–16)
LACTIC ACID, SEPSIS: 1.7 MG/DL (ref 0.5–1.9)
MAGNESIUM: 1.7 MG/DL (ref 1.7–2.3)
MCH RBC QN AUTO: 27.4 PG (ref 27–31)
MCHC RBC AUTO-ENTMCNC: 31 G/DL (ref 33–37)
MCV RBC AUTO: 88.4 FL (ref 81–99)
PDW BLD-RTO: 14.8 % (ref 11.5–14.5)
PLATELET # BLD: 250 K/UL (ref 130–400)
PMV BLD AUTO: 10.9 FL (ref 9.4–12.3)
POTASSIUM REFLEX MAGNESIUM: 3.2 MMOL/L (ref 3.5–5)
RBC # BLD: 3.54 M/UL (ref 4.2–5.4)
SODIUM BLD-SCNC: 137 MMOL/L (ref 136–145)
WBC # BLD: 32.9 K/UL (ref 4.8–10.8)

## 2019-10-05 PROCEDURE — 6370000000 HC RX 637 (ALT 250 FOR IP): Performed by: HOSPITALIST

## 2019-10-05 PROCEDURE — 85027 COMPLETE CBC AUTOMATED: CPT

## 2019-10-05 PROCEDURE — 6360000002 HC RX W HCPCS: Performed by: HOSPITALIST

## 2019-10-05 PROCEDURE — 2580000003 HC RX 258: Performed by: HOSPITALIST

## 2019-10-05 PROCEDURE — 1210000000 HC MED SURG R&B

## 2019-10-05 PROCEDURE — 80048 BASIC METABOLIC PNL TOTAL CA: CPT

## 2019-10-05 PROCEDURE — 83735 ASSAY OF MAGNESIUM: CPT

## 2019-10-05 PROCEDURE — 51798 US URINE CAPACITY MEASURE: CPT

## 2019-10-05 PROCEDURE — 36415 COLL VENOUS BLD VENIPUNCTURE: CPT

## 2019-10-05 PROCEDURE — 2700000000 HC OXYGEN THERAPY PER DAY

## 2019-10-05 PROCEDURE — 83605 ASSAY OF LACTIC ACID: CPT

## 2019-10-05 RX ORDER — 0.9 % SODIUM CHLORIDE 0.9 %
500 INTRAVENOUS SOLUTION INTRAVENOUS ONCE
Status: COMPLETED | OUTPATIENT
Start: 2019-10-05 | End: 2019-10-05

## 2019-10-05 RX ORDER — MELOXICAM 15 MG/1
15 TABLET ORAL DAILY
COMMUNITY

## 2019-10-05 RX ORDER — DORZOLAMIDE HCL 20 MG/ML
2 SOLUTION/ DROPS OPHTHALMIC 2 TIMES DAILY
Status: DISCONTINUED | OUTPATIENT
Start: 2019-10-05 | End: 2019-10-07 | Stop reason: HOSPADM

## 2019-10-05 RX ORDER — SODIUM CHLORIDE 0.9 % (FLUSH) 0.9 %
10 SYRINGE (ML) INJECTION PRN
Status: DISCONTINUED | OUTPATIENT
Start: 2019-10-05 | End: 2019-10-07 | Stop reason: HOSPADM

## 2019-10-05 RX ORDER — TRIAMCINOLONE ACETONIDE 1 MG/G
CREAM TOPICAL EVERY 8 HOURS PRN
COMMUNITY

## 2019-10-05 RX ORDER — LATANOPROST 50 UG/ML
1 SOLUTION/ DROPS OPHTHALMIC NIGHTLY
Status: DISCONTINUED | OUTPATIENT
Start: 2019-10-05 | End: 2019-10-07 | Stop reason: HOSPADM

## 2019-10-05 RX ORDER — 0.9 % SODIUM CHLORIDE 0.9 %
500 INTRAVENOUS SOLUTION INTRAVENOUS ONCE
Status: DISCONTINUED | OUTPATIENT
Start: 2019-10-05 | End: 2019-10-07 | Stop reason: HOSPADM

## 2019-10-05 RX ORDER — POTASSIUM CHLORIDE 20 MEQ/1
40 TABLET, EXTENDED RELEASE ORAL PRN
Status: DISCONTINUED | OUTPATIENT
Start: 2019-10-05 | End: 2019-10-07 | Stop reason: HOSPADM

## 2019-10-05 RX ORDER — SODIUM CHLORIDE 0.9 % (FLUSH) 0.9 %
10 SYRINGE (ML) INJECTION EVERY 12 HOURS SCHEDULED
Status: DISCONTINUED | OUTPATIENT
Start: 2019-10-05 | End: 2019-10-07 | Stop reason: HOSPADM

## 2019-10-05 RX ORDER — POTASSIUM CHLORIDE 7.45 MG/ML
10 INJECTION INTRAVENOUS PRN
Status: DISCONTINUED | OUTPATIENT
Start: 2019-10-05 | End: 2019-10-07 | Stop reason: HOSPADM

## 2019-10-05 RX ORDER — ONDANSETRON 8 MG/1
4 TABLET, ORALLY DISINTEGRATING ORAL EVERY 6 HOURS PRN
COMMUNITY

## 2019-10-05 RX ADMIN — OMEPRAZOLE 20 MG: 20 CAPSULE, DELAYED RELEASE ORAL at 08:34

## 2019-10-05 RX ADMIN — METHYLPREDNISOLONE SODIUM SUCCINATE 80 MG: 125 INJECTION, POWDER, FOR SOLUTION INTRAMUSCULAR; INTRAVENOUS at 15:55

## 2019-10-05 RX ADMIN — VANCOMYCIN HYDROCHLORIDE 750 MG: 750 INJECTION, POWDER, LYOPHILIZED, FOR SOLUTION INTRAVENOUS at 01:04

## 2019-10-05 RX ADMIN — METHYLPREDNISOLONE SODIUM SUCCINATE 80 MG: 125 INJECTION, POWDER, FOR SOLUTION INTRAMUSCULAR; INTRAVENOUS at 21:07

## 2019-10-05 RX ADMIN — MEROPENEM 1 G: 1 INJECTION, POWDER, FOR SOLUTION INTRAVENOUS at 12:47

## 2019-10-05 RX ADMIN — GUAIFENESIN 600 MG: 600 TABLET, EXTENDED RELEASE ORAL at 08:34

## 2019-10-05 RX ADMIN — METHYLPREDNISOLONE SODIUM SUCCINATE 80 MG: 125 INJECTION, POWDER, FOR SOLUTION INTRAMUSCULAR; INTRAVENOUS at 04:39

## 2019-10-05 RX ADMIN — DORZOLAMIDE HYDROCHLORIDE 2 DROP: 20 SOLUTION/ DROPS OPHTHALMIC at 21:07

## 2019-10-05 RX ADMIN — MEROPENEM 1 G: 1 INJECTION, POWDER, FOR SOLUTION INTRAVENOUS at 01:30

## 2019-10-05 RX ADMIN — POTASSIUM CHLORIDE 40 MEQ: 20 TABLET, EXTENDED RELEASE ORAL at 07:07

## 2019-10-05 RX ADMIN — BRIMONIDINE TARTRATE 1 DROP: 2 SOLUTION OPHTHALMIC at 08:35

## 2019-10-05 RX ADMIN — METHYLPREDNISOLONE SODIUM SUCCINATE 80 MG: 125 INJECTION, POWDER, FOR SOLUTION INTRAMUSCULAR; INTRAVENOUS at 08:34

## 2019-10-05 RX ADMIN — ENOXAPARIN SODIUM 30 MG: 30 INJECTION SUBCUTANEOUS at 08:34

## 2019-10-05 RX ADMIN — DORZOLAMIDE HYDROCHLORIDE 2 DROP: 20 SOLUTION/ DROPS OPHTHALMIC at 08:35

## 2019-10-05 RX ADMIN — GUAIFENESIN 600 MG: 600 TABLET, EXTENDED RELEASE ORAL at 21:06

## 2019-10-05 RX ADMIN — LATANOPROST 1 DROP: 50 SOLUTION OPHTHALMIC at 21:07

## 2019-10-05 RX ADMIN — SODIUM CHLORIDE 500 ML: 9 INJECTION, SOLUTION INTRAVENOUS at 10:30

## 2019-10-05 RX ADMIN — SODIUM CHLORIDE: 9 INJECTION, SOLUTION INTRAVENOUS at 21:07

## 2019-10-05 RX ADMIN — BRIMONIDINE TARTRATE 1 DROP: 2 SOLUTION OPHTHALMIC at 21:07

## 2019-10-05 ASSESSMENT — PAIN SCALES - GENERAL: PAINLEVEL_OUTOF10: 0

## 2019-10-06 PROBLEM — J44.0 COPD WITH ACUTE LOWER RESPIRATORY INFECTION (HCC): Status: ACTIVE | Noted: 2019-10-06

## 2019-10-06 PROBLEM — D64.9 NORMOCYTIC ANEMIA: Status: ACTIVE | Noted: 2019-10-06

## 2019-10-06 PROBLEM — E83.52 HYPERCALCEMIA: Status: ACTIVE | Noted: 2019-10-06

## 2019-10-06 PROBLEM — J96.21 ACUTE ON CHRONIC RESPIRATORY FAILURE WITH HYPOXIA (HCC): Status: ACTIVE | Noted: 2019-04-02

## 2019-10-06 PROBLEM — J18.9 HCAP (HEALTHCARE-ASSOCIATED PNEUMONIA): Status: ACTIVE | Noted: 2019-10-06

## 2019-10-06 LAB
ANION GAP SERPL CALCULATED.3IONS-SCNC: 11 MMOL/L (ref 7–19)
BUN BLDV-MCNC: 31 MG/DL (ref 8–23)
CALCIUM SERPL-MCNC: 9.7 MG/DL (ref 8.2–9.6)
CHLORIDE BLD-SCNC: 105 MMOL/L (ref 98–111)
CO2: 24 MMOL/L (ref 22–29)
CREAT SERPL-MCNC: 0.8 MG/DL (ref 0.5–0.9)
EKG P AXIS: NORMAL DEGREES
EKG P-R INTERVAL: NORMAL MS
EKG Q-T INTERVAL: 364 MS
EKG QRS DURATION: 80 MS
EKG QTC CALCULATION (BAZETT): 421 MS
EKG T AXIS: 63 DEGREES
GFR NON-AFRICAN AMERICAN: >60
GLUCOSE BLD-MCNC: 146 MG/DL (ref 74–109)
HCT VFR BLD CALC: 34.7 % (ref 37–47)
HEMOGLOBIN: 10.7 G/DL (ref 12–16)
MCH RBC QN AUTO: 27.4 PG (ref 27–31)
MCHC RBC AUTO-ENTMCNC: 30.8 G/DL (ref 33–37)
MCV RBC AUTO: 88.7 FL (ref 81–99)
MRSA CULTURE ONLY: ABNORMAL
ORGANISM: ABNORMAL
PDW BLD-RTO: 15 % (ref 11.5–14.5)
PLATELET # BLD: 272 K/UL (ref 130–400)
PMV BLD AUTO: 11.4 FL (ref 9.4–12.3)
POTASSIUM SERPL-SCNC: 4.2 MMOL/L (ref 3.5–5)
RBC # BLD: 3.91 M/UL (ref 4.2–5.4)
SODIUM BLD-SCNC: 140 MMOL/L (ref 136–145)
WBC # BLD: 20.4 K/UL (ref 4.8–10.8)

## 2019-10-06 PROCEDURE — 6360000002 HC RX W HCPCS: Performed by: HOSPITALIST

## 2019-10-06 PROCEDURE — 6360000002 HC RX W HCPCS: Performed by: FAMILY MEDICINE

## 2019-10-06 PROCEDURE — 36415 COLL VENOUS BLD VENIPUNCTURE: CPT

## 2019-10-06 PROCEDURE — 1210000000 HC MED SURG R&B

## 2019-10-06 PROCEDURE — 2580000003 HC RX 258: Performed by: HOSPITALIST

## 2019-10-06 PROCEDURE — 80048 BASIC METABOLIC PNL TOTAL CA: CPT

## 2019-10-06 PROCEDURE — 85027 COMPLETE CBC AUTOMATED: CPT

## 2019-10-06 PROCEDURE — 94640 AIRWAY INHALATION TREATMENT: CPT

## 2019-10-06 PROCEDURE — 6370000000 HC RX 637 (ALT 250 FOR IP): Performed by: HOSPITALIST

## 2019-10-06 PROCEDURE — 2700000000 HC OXYGEN THERAPY PER DAY

## 2019-10-06 PROCEDURE — 93010 ELECTROCARDIOGRAM REPORT: CPT | Performed by: INTERNAL MEDICINE

## 2019-10-06 RX ORDER — LEVOFLOXACIN 5 MG/ML
750 INJECTION, SOLUTION INTRAVENOUS
Status: DISCONTINUED | OUTPATIENT
Start: 2019-10-06 | End: 2019-10-07 | Stop reason: HOSPADM

## 2019-10-06 RX ORDER — LEVOFLOXACIN 5 MG/ML
750 INJECTION, SOLUTION INTRAVENOUS EVERY 24 HOURS
Status: DISCONTINUED | OUTPATIENT
Start: 2019-10-06 | End: 2019-10-06 | Stop reason: DRUGHIGH

## 2019-10-06 RX ORDER — IPRATROPIUM BROMIDE AND ALBUTEROL SULFATE 2.5; .5 MG/3ML; MG/3ML
1 SOLUTION RESPIRATORY (INHALATION) EVERY 4 HOURS PRN
Status: DISCONTINUED | OUTPATIENT
Start: 2019-10-06 | End: 2019-10-07 | Stop reason: HOSPADM

## 2019-10-06 RX ADMIN — METHYLPREDNISOLONE SODIUM SUCCINATE 80 MG: 125 INJECTION, POWDER, FOR SOLUTION INTRAMUSCULAR; INTRAVENOUS at 03:50

## 2019-10-06 RX ADMIN — BRIMONIDINE TARTRATE 1 DROP: 2 SOLUTION OPHTHALMIC at 09:17

## 2019-10-06 RX ADMIN — METHYLPREDNISOLONE SODIUM SUCCINATE 80 MG: 125 INJECTION, POWDER, FOR SOLUTION INTRAMUSCULAR; INTRAVENOUS at 15:05

## 2019-10-06 RX ADMIN — METHYLPREDNISOLONE SODIUM SUCCINATE 80 MG: 125 INJECTION, POWDER, FOR SOLUTION INTRAMUSCULAR; INTRAVENOUS at 09:18

## 2019-10-06 RX ADMIN — IPRATROPIUM BROMIDE AND ALBUTEROL SULFATE 1 AMPULE: .5; 3 SOLUTION RESPIRATORY (INHALATION) at 04:42

## 2019-10-06 RX ADMIN — GUAIFENESIN 600 MG: 600 TABLET, EXTENDED RELEASE ORAL at 20:23

## 2019-10-06 RX ADMIN — GUAIFENESIN 600 MG: 600 TABLET, EXTENDED RELEASE ORAL at 09:17

## 2019-10-06 RX ADMIN — METHYLPREDNISOLONE SODIUM SUCCINATE 80 MG: 125 INJECTION, POWDER, FOR SOLUTION INTRAMUSCULAR; INTRAVENOUS at 20:23

## 2019-10-06 RX ADMIN — MEROPENEM 1 G: 1 INJECTION, POWDER, FOR SOLUTION INTRAVENOUS at 02:08

## 2019-10-06 RX ADMIN — DORZOLAMIDE HYDROCHLORIDE 2 DROP: 20 SOLUTION/ DROPS OPHTHALMIC at 20:23

## 2019-10-06 RX ADMIN — DORZOLAMIDE HYDROCHLORIDE 2 DROP: 20 SOLUTION/ DROPS OPHTHALMIC at 09:16

## 2019-10-06 RX ADMIN — Medication 10 ML: at 20:23

## 2019-10-06 RX ADMIN — Medication 10 ML: at 09:18

## 2019-10-06 RX ADMIN — LEVOFLOXACIN 750 MG: 5 INJECTION, SOLUTION INTRAVENOUS at 13:12

## 2019-10-06 RX ADMIN — SODIUM CHLORIDE: 9 INJECTION, SOLUTION INTRAVENOUS at 15:05

## 2019-10-06 RX ADMIN — OMEPRAZOLE 20 MG: 20 CAPSULE, DELAYED RELEASE ORAL at 09:17

## 2019-10-06 RX ADMIN — ENOXAPARIN SODIUM 30 MG: 30 INJECTION SUBCUTANEOUS at 09:17

## 2019-10-06 RX ADMIN — LATANOPROST 1 DROP: 50 SOLUTION OPHTHALMIC at 20:23

## 2019-10-06 RX ADMIN — BRIMONIDINE TARTRATE 1 DROP: 2 SOLUTION OPHTHALMIC at 20:22

## 2019-10-06 ASSESSMENT — PAIN SCALES - GENERAL: PAINLEVEL_OUTOF10: 0

## 2019-10-07 ENCOUNTER — APPOINTMENT (OUTPATIENT)
Dept: GENERAL RADIOLOGY | Age: 84
DRG: 193 | End: 2019-10-07
Payer: MEDICARE

## 2019-10-07 VITALS
TEMPERATURE: 97.9 F | HEIGHT: 65 IN | RESPIRATION RATE: 24 BRPM | SYSTOLIC BLOOD PRESSURE: 146 MMHG | OXYGEN SATURATION: 99 % | BODY MASS INDEX: 16.6 KG/M2 | HEART RATE: 90 BPM | WEIGHT: 99.6 LBS | DIASTOLIC BLOOD PRESSURE: 77 MMHG

## 2019-10-07 PROBLEM — J96.21 ACUTE ON CHRONIC RESPIRATORY FAILURE WITH HYPOXIA (HCC): Status: RESOLVED | Noted: 2019-04-02 | Resolved: 2019-10-07

## 2019-10-07 LAB
ANION GAP SERPL CALCULATED.3IONS-SCNC: 12 MMOL/L (ref 7–19)
BUN BLDV-MCNC: 32 MG/DL (ref 8–23)
CALCIUM SERPL-MCNC: 9.4 MG/DL (ref 8.2–9.6)
CHLORIDE BLD-SCNC: 106 MMOL/L (ref 98–111)
CO2: 22 MMOL/L (ref 22–29)
CREAT SERPL-MCNC: 0.6 MG/DL (ref 0.5–0.9)
GFR NON-AFRICAN AMERICAN: >60
GLUCOSE BLD-MCNC: 159 MG/DL (ref 74–109)
HCT VFR BLD CALC: 33.4 % (ref 37–47)
HEMOGLOBIN: 10.3 G/DL (ref 12–16)
MCH RBC QN AUTO: 26.8 PG (ref 27–31)
MCHC RBC AUTO-ENTMCNC: 30.8 G/DL (ref 33–37)
MCV RBC AUTO: 86.8 FL (ref 81–99)
PDW BLD-RTO: 15.1 % (ref 11.5–14.5)
PLATELET # BLD: 301 K/UL (ref 130–400)
PMV BLD AUTO: 11.3 FL (ref 9.4–12.3)
POTASSIUM SERPL-SCNC: 4 MMOL/L (ref 3.5–5)
RBC # BLD: 3.85 M/UL (ref 4.2–5.4)
SODIUM BLD-SCNC: 140 MMOL/L (ref 136–145)
WBC # BLD: 8 K/UL (ref 4.8–10.8)

## 2019-10-07 PROCEDURE — 97530 THERAPEUTIC ACTIVITIES: CPT

## 2019-10-07 PROCEDURE — 6360000002 HC RX W HCPCS: Performed by: HOSPITALIST

## 2019-10-07 PROCEDURE — 97161 PT EVAL LOW COMPLEX 20 MIN: CPT

## 2019-10-07 PROCEDURE — 2700000000 HC OXYGEN THERAPY PER DAY

## 2019-10-07 PROCEDURE — 97165 OT EVAL LOW COMPLEX 30 MIN: CPT

## 2019-10-07 PROCEDURE — 80048 BASIC METABOLIC PNL TOTAL CA: CPT

## 2019-10-07 PROCEDURE — 6370000000 HC RX 637 (ALT 250 FOR IP): Performed by: HOSPITALIST

## 2019-10-07 PROCEDURE — 85027 COMPLETE CBC AUTOMATED: CPT

## 2019-10-07 PROCEDURE — 97116 GAIT TRAINING THERAPY: CPT

## 2019-10-07 PROCEDURE — 36415 COLL VENOUS BLD VENIPUNCTURE: CPT

## 2019-10-07 PROCEDURE — 71046 X-RAY EXAM CHEST 2 VIEWS: CPT

## 2019-10-07 RX ORDER — LEVOFLOXACIN 750 MG/1
750 TABLET ORAL DAILY
Qty: 4 TABLET | Refills: 0 | Status: SHIPPED | OUTPATIENT
Start: 2019-10-08 | End: 2019-10-12

## 2019-10-07 RX ORDER — PREDNISONE 20 MG/1
TABLET ORAL
Qty: 7 TABLET | Refills: 0 | Status: SHIPPED | OUTPATIENT
Start: 2019-10-07 | End: 2019-10-13

## 2019-10-07 RX ADMIN — ENOXAPARIN SODIUM 30 MG: 30 INJECTION SUBCUTANEOUS at 09:00

## 2019-10-07 RX ADMIN — BRIMONIDINE TARTRATE 1 DROP: 2 SOLUTION OPHTHALMIC at 09:01

## 2019-10-07 RX ADMIN — METHYLPREDNISOLONE SODIUM SUCCINATE 80 MG: 125 INJECTION, POWDER, FOR SOLUTION INTRAMUSCULAR; INTRAVENOUS at 05:07

## 2019-10-07 RX ADMIN — GUAIFENESIN 600 MG: 600 TABLET, EXTENDED RELEASE ORAL at 09:00

## 2019-10-07 RX ADMIN — OMEPRAZOLE 20 MG: 20 CAPSULE, DELAYED RELEASE ORAL at 09:00

## 2019-10-07 RX ADMIN — METHYLPREDNISOLONE SODIUM SUCCINATE 80 MG: 125 INJECTION, POWDER, FOR SOLUTION INTRAMUSCULAR; INTRAVENOUS at 09:00

## 2019-10-07 RX ADMIN — DORZOLAMIDE HYDROCHLORIDE 2 DROP: 20 SOLUTION/ DROPS OPHTHALMIC at 09:00

## 2019-10-07 ASSESSMENT — PAIN SCALES - GENERAL: PAINLEVEL_OUTOF10: 0

## 2019-10-08 ENCOUNTER — CARE COORDINATION (OUTPATIENT)
Dept: CASE MANAGEMENT | Age: 84
End: 2019-10-08

## 2019-10-09 LAB
BLOOD CULTURE, ROUTINE: NORMAL
CULTURE, BLOOD 2: NORMAL

## 2019-11-29 ENCOUNTER — HOSPITAL ENCOUNTER (INPATIENT)
Age: 84
LOS: 1 days | Discharge: NURSING, REHAB OR LONG-TERM CARE | DRG: 190 | End: 2019-11-30
Attending: EMERGENCY MEDICINE | Admitting: FAMILY MEDICINE
Payer: MEDICARE

## 2019-11-29 ENCOUNTER — APPOINTMENT (OUTPATIENT)
Dept: GENERAL RADIOLOGY | Age: 84
DRG: 190 | End: 2019-11-29
Payer: MEDICARE

## 2019-11-29 DIAGNOSIS — J44.1 COPD EXACERBATION (HCC): Primary | ICD-10-CM

## 2019-11-29 LAB
ALBUMIN SERPL-MCNC: 3.9 G/DL (ref 3.5–5.2)
ALP BLD-CCNC: 85 U/L (ref 35–104)
ALT SERPL-CCNC: 10 U/L (ref 5–33)
ANION GAP SERPL CALCULATED.3IONS-SCNC: 12 MMOL/L (ref 7–19)
APTT: 22.4 SEC (ref 26–36.2)
AST SERPL-CCNC: 18 U/L (ref 5–32)
BASE EXCESS ARTERIAL: 4.9 MMOL/L (ref -2–2)
BASOPHILS ABSOLUTE: 0 K/UL (ref 0–0.2)
BASOPHILS RELATIVE PERCENT: 0.5 % (ref 0–1)
BILIRUB SERPL-MCNC: <0.2 MG/DL (ref 0.2–1.2)
BUN BLDV-MCNC: 15 MG/DL (ref 8–23)
CALCIUM SERPL-MCNC: 9.6 MG/DL (ref 8.2–9.6)
CARBOXYHEMOGLOBIN ARTERIAL: 2.1 % (ref 0–5)
CHLORIDE BLD-SCNC: 104 MMOL/L (ref 98–111)
CO2: 27 MMOL/L (ref 22–29)
CREAT SERPL-MCNC: 0.5 MG/DL (ref 0.5–0.9)
EOSINOPHILS ABSOLUTE: 1.1 K/UL (ref 0–0.6)
EOSINOPHILS RELATIVE PERCENT: 12.3 % (ref 0–5)
GFR NON-AFRICAN AMERICAN: >60
GLUCOSE BLD-MCNC: 124 MG/DL (ref 74–109)
HCO3 ARTERIAL: 30.9 MMOL/L (ref 22–26)
HCT VFR BLD CALC: 39.9 % (ref 37–47)
HEMOGLOBIN, ART, EXTENDED: 12.1 G/DL (ref 12–16)
HEMOGLOBIN: 12.1 G/DL (ref 12–16)
IMMATURE GRANULOCYTES #: 0 K/UL
INR BLD: 0.93 (ref 0.88–1.18)
LYMPHOCYTES ABSOLUTE: 1.8 K/UL (ref 1.1–4.5)
LYMPHOCYTES RELATIVE PERCENT: 20.6 % (ref 20–40)
MAGNESIUM: 1.9 MG/DL (ref 1.7–2.3)
MCH RBC QN AUTO: 27.4 PG (ref 27–31)
MCHC RBC AUTO-ENTMCNC: 30.3 G/DL (ref 33–37)
MCV RBC AUTO: 90.3 FL (ref 81–99)
METHEMOGLOBIN ARTERIAL: 0.9 %
MONOCYTES ABSOLUTE: 0.6 K/UL (ref 0–0.9)
MONOCYTES RELATIVE PERCENT: 6.3 % (ref 0–10)
NEUTROPHILS ABSOLUTE: 5.3 K/UL (ref 1.5–7.5)
NEUTROPHILS RELATIVE PERCENT: 60.1 % (ref 50–65)
O2 CONTENT ARTERIAL: 16 ML/DL
O2 SAT, ARTERIAL: 94.1 %
O2 THERAPY: ABNORMAL
PCO2 ARTERIAL: 51 MMHG (ref 35–45)
PDW BLD-RTO: 14.7 % (ref 11.5–14.5)
PH ARTERIAL: 7.39 (ref 7.35–7.45)
PLATELET # BLD: 219 K/UL (ref 130–400)
PMV BLD AUTO: 11.3 FL (ref 9.4–12.3)
PO2 ARTERIAL: 69 MMHG (ref 80–100)
POTASSIUM SERPL-SCNC: 3.8 MMOL/L (ref 3.5–5)
POTASSIUM, WHOLE BLOOD: 3.5
PRO-BNP: 187 PG/ML (ref 0–1800)
PROTHROMBIN TIME: 11.9 SEC (ref 12–14.6)
RBC # BLD: 4.42 M/UL (ref 4.2–5.4)
SODIUM BLD-SCNC: 143 MMOL/L (ref 136–145)
TOTAL PROTEIN: 6.6 G/DL (ref 6.6–8.7)
TROPONIN: 0.02 NG/ML (ref 0–0.03)
WBC # BLD: 8.8 K/UL (ref 4.8–10.8)

## 2019-11-29 PROCEDURE — 36415 COLL VENOUS BLD VENIPUNCTURE: CPT

## 2019-11-29 PROCEDURE — 84484 ASSAY OF TROPONIN QUANT: CPT

## 2019-11-29 PROCEDURE — 96374 THER/PROPH/DIAG INJ IV PUSH: CPT

## 2019-11-29 PROCEDURE — 6360000002 HC RX W HCPCS: Performed by: HOSPITALIST

## 2019-11-29 PROCEDURE — 6370000000 HC RX 637 (ALT 250 FOR IP): Performed by: EMERGENCY MEDICINE

## 2019-11-29 PROCEDURE — 85610 PROTHROMBIN TIME: CPT

## 2019-11-29 PROCEDURE — 83880 ASSAY OF NATRIURETIC PEPTIDE: CPT

## 2019-11-29 PROCEDURE — 80053 COMPREHEN METABOLIC PANEL: CPT

## 2019-11-29 PROCEDURE — 6370000000 HC RX 637 (ALT 250 FOR IP): Performed by: PHYSICIAN ASSISTANT

## 2019-11-29 PROCEDURE — 36600 WITHDRAWAL OF ARTERIAL BLOOD: CPT

## 2019-11-29 PROCEDURE — 1210000000 HC MED SURG R&B

## 2019-11-29 PROCEDURE — 82803 BLOOD GASES ANY COMBINATION: CPT

## 2019-11-29 PROCEDURE — 6360000002 HC RX W HCPCS: Performed by: EMERGENCY MEDICINE

## 2019-11-29 PROCEDURE — 71045 X-RAY EXAM CHEST 1 VIEW: CPT

## 2019-11-29 PROCEDURE — 84132 ASSAY OF SERUM POTASSIUM: CPT

## 2019-11-29 PROCEDURE — 2700000000 HC OXYGEN THERAPY PER DAY

## 2019-11-29 PROCEDURE — 85730 THROMBOPLASTIN TIME PARTIAL: CPT

## 2019-11-29 PROCEDURE — 85025 COMPLETE CBC W/AUTO DIFF WBC: CPT

## 2019-11-29 PROCEDURE — 96375 TX/PRO/DX INJ NEW DRUG ADDON: CPT

## 2019-11-29 PROCEDURE — 6370000000 HC RX 637 (ALT 250 FOR IP): Performed by: HOSPITALIST

## 2019-11-29 PROCEDURE — 99285 EMERGENCY DEPT VISIT HI MDM: CPT

## 2019-11-29 PROCEDURE — 94640 AIRWAY INHALATION TREATMENT: CPT

## 2019-11-29 PROCEDURE — 83735 ASSAY OF MAGNESIUM: CPT

## 2019-11-29 PROCEDURE — 93005 ELECTROCARDIOGRAM TRACING: CPT

## 2019-11-29 PROCEDURE — 2580000003 HC RX 258: Performed by: HOSPITALIST

## 2019-11-29 RX ORDER — PANTOPRAZOLE SODIUM 40 MG/1
40 TABLET, DELAYED RELEASE ORAL
Status: DISCONTINUED | OUTPATIENT
Start: 2019-11-29 | End: 2019-11-30 | Stop reason: HOSPADM

## 2019-11-29 RX ORDER — ALBUTEROL SULFATE 2.5 MG/3ML
2.5 SOLUTION RESPIRATORY (INHALATION)
Status: DISCONTINUED | OUTPATIENT
Start: 2019-11-29 | End: 2019-11-30 | Stop reason: HOSPADM

## 2019-11-29 RX ORDER — IPRATROPIUM BROMIDE AND ALBUTEROL SULFATE 2.5; .5 MG/3ML; MG/3ML
1 SOLUTION RESPIRATORY (INHALATION)
Status: DISCONTINUED | OUTPATIENT
Start: 2019-11-29 | End: 2019-11-30 | Stop reason: HOSPADM

## 2019-11-29 RX ORDER — METHYLPREDNISOLONE SODIUM SUCCINATE 125 MG/2ML
125 INJECTION, POWDER, LYOPHILIZED, FOR SOLUTION INTRAMUSCULAR; INTRAVENOUS ONCE
Status: COMPLETED | OUTPATIENT
Start: 2019-11-29 | End: 2019-11-29

## 2019-11-29 RX ORDER — ONDANSETRON 4 MG/1
4 TABLET, ORALLY DISINTEGRATING ORAL EVERY 6 HOURS PRN
Status: DISCONTINUED | OUTPATIENT
Start: 2019-11-29 | End: 2019-11-30 | Stop reason: HOSPADM

## 2019-11-29 RX ORDER — M-VIT,TX,IRON,MINS/CALC/FOLIC 27MG-0.4MG
1 TABLET ORAL DAILY
Status: DISCONTINUED | OUTPATIENT
Start: 2019-11-29 | End: 2019-11-30 | Stop reason: HOSPADM

## 2019-11-29 RX ORDER — FORMOTEROL FUMARATE 20 UG/2ML
20 SOLUTION RESPIRATORY (INHALATION) EVERY 12 HOURS SCHEDULED
Status: DISCONTINUED | OUTPATIENT
Start: 2019-11-29 | End: 2019-11-30 | Stop reason: HOSPADM

## 2019-11-29 RX ORDER — METHYLPREDNISOLONE SODIUM SUCCINATE 40 MG/ML
40 INJECTION, POWDER, LYOPHILIZED, FOR SOLUTION INTRAMUSCULAR; INTRAVENOUS DAILY
Status: DISCONTINUED | OUTPATIENT
Start: 2019-11-30 | End: 2019-11-30 | Stop reason: HOSPADM

## 2019-11-29 RX ORDER — GUAIFENESIN 600 MG/1
600 TABLET, EXTENDED RELEASE ORAL 2 TIMES DAILY
Status: DISCONTINUED | OUTPATIENT
Start: 2019-11-29 | End: 2019-11-30 | Stop reason: HOSPADM

## 2019-11-29 RX ORDER — AMLODIPINE BESYLATE 5 MG/1
5 TABLET ORAL 2 TIMES DAILY
Status: DISCONTINUED | OUTPATIENT
Start: 2019-11-29 | End: 2019-11-30 | Stop reason: HOSPADM

## 2019-11-29 RX ORDER — MELOXICAM 7.5 MG/1
15 TABLET ORAL DAILY
Status: DISCONTINUED | OUTPATIENT
Start: 2019-11-29 | End: 2019-11-30 | Stop reason: HOSPADM

## 2019-11-29 RX ORDER — SODIUM CHLORIDE 0.9 % (FLUSH) 0.9 %
10 SYRINGE (ML) INJECTION EVERY 12 HOURS SCHEDULED
Status: DISCONTINUED | OUTPATIENT
Start: 2019-11-29 | End: 2019-11-30 | Stop reason: HOSPADM

## 2019-11-29 RX ORDER — ONDANSETRON 2 MG/ML
4 INJECTION INTRAMUSCULAR; INTRAVENOUS EVERY 6 HOURS PRN
Status: DISCONTINUED | OUTPATIENT
Start: 2019-11-29 | End: 2019-11-30 | Stop reason: HOSPADM

## 2019-11-29 RX ORDER — FUROSEMIDE 40 MG/1
20 TABLET ORAL DAILY
Status: DISCONTINUED | OUTPATIENT
Start: 2019-11-29 | End: 2019-11-30 | Stop reason: HOSPADM

## 2019-11-29 RX ORDER — BUDESONIDE 0.5 MG/2ML
0.5 INHALANT ORAL EVERY 12 HOURS
Status: DISCONTINUED | OUTPATIENT
Start: 2019-11-29 | End: 2019-11-30 | Stop reason: HOSPADM

## 2019-11-29 RX ORDER — TRIAMCINOLONE ACETONIDE 1 MG/G
CREAM TOPICAL EVERY 8 HOURS PRN
Status: DISCONTINUED | OUTPATIENT
Start: 2019-11-29 | End: 2019-11-30 | Stop reason: HOSPADM

## 2019-11-29 RX ORDER — SODIUM CHLORIDE 0.9 % (FLUSH) 0.9 %
10 SYRINGE (ML) INJECTION PRN
Status: DISCONTINUED | OUTPATIENT
Start: 2019-11-29 | End: 2019-11-30 | Stop reason: HOSPADM

## 2019-11-29 RX ORDER — BRIMONIDINE TARTRATE 2 MG/ML
1 SOLUTION/ DROPS OPHTHALMIC 2 TIMES DAILY
Status: DISCONTINUED | OUTPATIENT
Start: 2019-11-29 | End: 2019-11-30 | Stop reason: HOSPADM

## 2019-11-29 RX ORDER — ACETAMINOPHEN 500 MG
1000 TABLET ORAL EVERY 8 HOURS PRN
Status: DISCONTINUED | OUTPATIENT
Start: 2019-11-29 | End: 2019-11-30 | Stop reason: HOSPADM

## 2019-11-29 RX ORDER — DORZOLAMIDE HCL 20 MG/ML
2 SOLUTION/ DROPS OPHTHALMIC 2 TIMES DAILY
Status: DISCONTINUED | OUTPATIENT
Start: 2019-11-29 | End: 2019-11-30 | Stop reason: HOSPADM

## 2019-11-29 RX ORDER — POLYETHYLENE GLYCOL 3350 17 G/17G
17 POWDER, FOR SOLUTION ORAL DAILY PRN
Status: DISCONTINUED | OUTPATIENT
Start: 2019-11-29 | End: 2019-11-30 | Stop reason: HOSPADM

## 2019-11-29 RX ORDER — LATANOPROST 50 UG/ML
1 SOLUTION/ DROPS OPHTHALMIC NIGHTLY
Status: DISCONTINUED | OUTPATIENT
Start: 2019-11-29 | End: 2019-11-30 | Stop reason: HOSPADM

## 2019-11-29 RX ORDER — IPRATROPIUM BROMIDE AND ALBUTEROL SULFATE 2.5; .5 MG/3ML; MG/3ML
1 SOLUTION RESPIRATORY (INHALATION) ONCE
Status: COMPLETED | OUTPATIENT
Start: 2019-11-29 | End: 2019-11-29

## 2019-11-29 RX ADMIN — IPRATROPIUM BROMIDE 0.5 MG: 0.5 SOLUTION RESPIRATORY (INHALATION) at 07:42

## 2019-11-29 RX ADMIN — DORZOLAMIDE HYDROCHLORIDE 2 DROP: 20 SOLUTION/ DROPS OPHTHALMIC at 20:34

## 2019-11-29 RX ADMIN — METHYLPREDNISOLONE SODIUM SUCCINATE 125 MG: 125 INJECTION, POWDER, FOR SOLUTION INTRAMUSCULAR; INTRAVENOUS at 05:10

## 2019-11-29 RX ADMIN — MULTIPLE VITAMINS W/ MINERALS TAB 1 TABLET: TAB at 08:35

## 2019-11-29 RX ADMIN — IPRATROPIUM BROMIDE AND ALBUTEROL SULFATE 1 AMPULE: .5; 3 SOLUTION RESPIRATORY (INHALATION) at 20:04

## 2019-11-29 RX ADMIN — Medication 500 MG: at 05:10

## 2019-11-29 RX ADMIN — MELOXICAM 15 MG: 7.5 TABLET ORAL at 08:35

## 2019-11-29 RX ADMIN — GUAIFENESIN 600 MG: 600 TABLET, EXTENDED RELEASE ORAL at 20:33

## 2019-11-29 RX ADMIN — BUDESONIDE 500 MCG: 0.5 INHALANT RESPIRATORY (INHALATION) at 20:08

## 2019-11-29 RX ADMIN — DORZOLAMIDE HYDROCHLORIDE 2 DROP: 20 SOLUTION/ DROPS OPHTHALMIC at 11:01

## 2019-11-29 RX ADMIN — IPRATROPIUM BROMIDE AND ALBUTEROL SULFATE 1 AMPULE: .5; 3 SOLUTION RESPIRATORY (INHALATION) at 05:16

## 2019-11-29 RX ADMIN — FORMOTEROL FUMARATE DIHYDRATE 20 MCG: 20 SOLUTION RESPIRATORY (INHALATION) at 20:08

## 2019-11-29 RX ADMIN — BRIMONIDINE TARTRATE 1 DROP: 2 SOLUTION OPHTHALMIC at 11:01

## 2019-11-29 RX ADMIN — ENOXAPARIN SODIUM 40 MG: 40 INJECTION SUBCUTANEOUS at 08:35

## 2019-11-29 RX ADMIN — IPRATROPIUM BROMIDE AND ALBUTEROL SULFATE 1 AMPULE: .5; 3 SOLUTION RESPIRATORY (INHALATION) at 11:02

## 2019-11-29 RX ADMIN — LATANOPROST 1 DROP: 50 SOLUTION OPHTHALMIC at 20:34

## 2019-11-29 RX ADMIN — GUAIFENESIN 600 MG: 600 TABLET, EXTENDED RELEASE ORAL at 08:37

## 2019-11-29 RX ADMIN — PANTOPRAZOLE SODIUM 40 MG: 40 TABLET, DELAYED RELEASE ORAL at 08:35

## 2019-11-29 RX ADMIN — BUDESONIDE 500 MCG: 0.5 INHALANT RESPIRATORY (INHALATION) at 07:54

## 2019-11-29 RX ADMIN — AMLODIPINE BESYLATE 5 MG: 5 TABLET ORAL at 20:34

## 2019-11-29 RX ADMIN — AMLODIPINE BESYLATE 5 MG: 5 TABLET ORAL at 08:37

## 2019-11-29 RX ADMIN — Medication 10 ML: at 08:35

## 2019-11-29 RX ADMIN — BRIMONIDINE TARTRATE 1 DROP: 2 SOLUTION OPHTHALMIC at 20:34

## 2019-11-29 RX ADMIN — FUROSEMIDE 20 MG: 40 TABLET ORAL at 08:37

## 2019-11-29 RX ADMIN — FORMOTEROL FUMARATE DIHYDRATE 20 MCG: 20 SOLUTION RESPIRATORY (INHALATION) at 07:54

## 2019-11-29 RX ADMIN — IPRATROPIUM BROMIDE AND ALBUTEROL SULFATE 1 AMPULE: .5; 3 SOLUTION RESPIRATORY (INHALATION) at 15:05

## 2019-11-29 ASSESSMENT — ENCOUNTER SYMPTOMS
DIARRHEA: 0
BACK PAIN: 0
SHORTNESS OF BREATH: 1
WHEEZING: 1
ABDOMINAL PAIN: 0
RHINORRHEA: 0
SORE THROAT: 0
COUGH: 1
VOMITING: 0
NAUSEA: 0

## 2019-11-29 ASSESSMENT — PAIN SCALES - GENERAL
PAINLEVEL_OUTOF10: 4
PAINLEVEL_OUTOF10: 0

## 2019-11-30 VITALS
BODY MASS INDEX: 16.64 KG/M2 | OXYGEN SATURATION: 91 % | TEMPERATURE: 98.2 F | HEART RATE: 96 BPM | SYSTOLIC BLOOD PRESSURE: 138 MMHG | DIASTOLIC BLOOD PRESSURE: 77 MMHG | RESPIRATION RATE: 16 BRPM | WEIGHT: 100 LBS

## 2019-11-30 LAB
ANION GAP SERPL CALCULATED.3IONS-SCNC: 12 MMOL/L (ref 7–19)
BASOPHILS ABSOLUTE: 0 K/UL (ref 0–0.2)
BASOPHILS RELATIVE PERCENT: 0.1 % (ref 0–1)
BUN BLDV-MCNC: 20 MG/DL (ref 8–23)
CALCIUM SERPL-MCNC: 9.6 MG/DL (ref 8.2–9.6)
CHLORIDE BLD-SCNC: 105 MMOL/L (ref 98–111)
CO2: 27 MMOL/L (ref 22–29)
CREAT SERPL-MCNC: 0.6 MG/DL (ref 0.5–0.9)
EOSINOPHILS ABSOLUTE: 0.1 K/UL (ref 0–0.6)
EOSINOPHILS RELATIVE PERCENT: 0.9 % (ref 0–5)
GFR NON-AFRICAN AMERICAN: >60
GLUCOSE BLD-MCNC: 95 MG/DL (ref 74–109)
HCT VFR BLD CALC: 35.5 % (ref 37–47)
HEMOGLOBIN: 11.4 G/DL (ref 12–16)
IMMATURE GRANULOCYTES #: 0 K/UL
LYMPHOCYTES ABSOLUTE: 2.4 K/UL (ref 1.1–4.5)
LYMPHOCYTES RELATIVE PERCENT: 29.7 % (ref 20–40)
MCH RBC QN AUTO: 28.4 PG (ref 27–31)
MCHC RBC AUTO-ENTMCNC: 32.1 G/DL (ref 33–37)
MCV RBC AUTO: 88.5 FL (ref 81–99)
MONOCYTES ABSOLUTE: 0.7 K/UL (ref 0–0.9)
MONOCYTES RELATIVE PERCENT: 8.3 % (ref 0–10)
NEUTROPHILS ABSOLUTE: 4.9 K/UL (ref 1.5–7.5)
NEUTROPHILS RELATIVE PERCENT: 60.6 % (ref 50–65)
PDW BLD-RTO: 14.8 % (ref 11.5–14.5)
PLATELET # BLD: 209 K/UL (ref 130–400)
PMV BLD AUTO: 11 FL (ref 9.4–12.3)
POTASSIUM REFLEX MAGNESIUM: 3.8 MMOL/L (ref 3.5–5)
RBC # BLD: 4.01 M/UL (ref 4.2–5.4)
SODIUM BLD-SCNC: 144 MMOL/L (ref 136–145)
WBC # BLD: 8.1 K/UL (ref 4.8–10.8)

## 2019-11-30 PROCEDURE — 94640 AIRWAY INHALATION TREATMENT: CPT

## 2019-11-30 PROCEDURE — 6360000002 HC RX W HCPCS: Performed by: PHYSICIAN ASSISTANT

## 2019-11-30 PROCEDURE — 85025 COMPLETE CBC W/AUTO DIFF WBC: CPT

## 2019-11-30 PROCEDURE — 80048 BASIC METABOLIC PNL TOTAL CA: CPT

## 2019-11-30 PROCEDURE — 6370000000 HC RX 637 (ALT 250 FOR IP): Performed by: HOSPITALIST

## 2019-11-30 PROCEDURE — 2700000000 HC OXYGEN THERAPY PER DAY

## 2019-11-30 PROCEDURE — 97161 PT EVAL LOW COMPLEX 20 MIN: CPT

## 2019-11-30 PROCEDURE — 2580000003 HC RX 258: Performed by: PHYSICIAN ASSISTANT

## 2019-11-30 PROCEDURE — 2580000003 HC RX 258: Performed by: HOSPITALIST

## 2019-11-30 PROCEDURE — 6360000002 HC RX W HCPCS: Performed by: HOSPITALIST

## 2019-11-30 PROCEDURE — 6370000000 HC RX 637 (ALT 250 FOR IP): Performed by: PHYSICIAN ASSISTANT

## 2019-11-30 PROCEDURE — 36415 COLL VENOUS BLD VENIPUNCTURE: CPT

## 2019-11-30 RX ORDER — AZITHROMYCIN 500 MG/1
500 TABLET, FILM COATED ORAL DAILY
Qty: 3 TABLET | Refills: 0 | Status: SHIPPED | OUTPATIENT
Start: 2019-12-01 | End: 2019-12-04

## 2019-11-30 RX ORDER — PREDNISONE 20 MG/1
TABLET ORAL
Qty: 7 TABLET | Refills: 0 | Status: SHIPPED | OUTPATIENT
Start: 2019-11-30 | End: 2019-12-06

## 2019-11-30 RX ADMIN — Medication 10 ML: at 08:29

## 2019-11-30 RX ADMIN — BUDESONIDE 500 MCG: 0.5 INHALANT RESPIRATORY (INHALATION) at 07:13

## 2019-11-30 RX ADMIN — AMLODIPINE BESYLATE 5 MG: 5 TABLET ORAL at 08:28

## 2019-11-30 RX ADMIN — GUAIFENESIN 600 MG: 600 TABLET, EXTENDED RELEASE ORAL at 08:28

## 2019-11-30 RX ADMIN — IPRATROPIUM BROMIDE AND ALBUTEROL SULFATE 1 AMPULE: .5; 3 SOLUTION RESPIRATORY (INHALATION) at 11:15

## 2019-11-30 RX ADMIN — BRIMONIDINE TARTRATE 1 DROP: 2 SOLUTION OPHTHALMIC at 08:29

## 2019-11-30 RX ADMIN — FUROSEMIDE 20 MG: 40 TABLET ORAL at 08:28

## 2019-11-30 RX ADMIN — MELOXICAM 15 MG: 7.5 TABLET ORAL at 08:28

## 2019-11-30 RX ADMIN — MULTIPLE VITAMINS W/ MINERALS TAB 1 TABLET: TAB at 08:28

## 2019-11-30 RX ADMIN — IPRATROPIUM BROMIDE AND ALBUTEROL SULFATE 1 AMPULE: .5; 3 SOLUTION RESPIRATORY (INHALATION) at 07:06

## 2019-11-30 RX ADMIN — METHYLPREDNISOLONE SODIUM SUCCINATE 40 MG: 40 INJECTION, POWDER, FOR SOLUTION INTRAMUSCULAR; INTRAVENOUS at 08:27

## 2019-11-30 RX ADMIN — FORMOTEROL FUMARATE DIHYDRATE 20 MCG: 20 SOLUTION RESPIRATORY (INHALATION) at 07:13

## 2019-11-30 RX ADMIN — DORZOLAMIDE HYDROCHLORIDE 2 DROP: 20 SOLUTION/ DROPS OPHTHALMIC at 08:29

## 2019-11-30 RX ADMIN — PANTOPRAZOLE SODIUM 40 MG: 40 TABLET, DELAYED RELEASE ORAL at 05:52

## 2019-11-30 RX ADMIN — ENOXAPARIN SODIUM 40 MG: 40 INJECTION SUBCUTANEOUS at 08:28

## 2019-11-30 RX ADMIN — AZITHROMYCIN 500 MG: 500 INJECTION, POWDER, LYOPHILIZED, FOR SOLUTION INTRAVENOUS at 05:26

## 2019-11-30 ASSESSMENT — PAIN SCALES - GENERAL: PAINLEVEL_OUTOF10: 0

## 2019-12-03 LAB
EKG P AXIS: 83 DEGREES
EKG P-R INTERVAL: 188 MS
EKG Q-T INTERVAL: 340 MS
EKG QRS DURATION: 74 MS
EKG QTC CALCULATION (BAZETT): 422 MS
EKG T AXIS: 64 DEGREES

## 2021-01-06 ENCOUNTER — HOSPITAL ENCOUNTER (EMERGENCY)
Age: 86
Discharge: HOME | End: 2021-01-06
Attending: EMERGENCY MEDICINE
Payer: MEDICARE

## 2021-01-06 ENCOUNTER — APPOINTMENT (OUTPATIENT)
Dept: GENERAL RADIOLOGY | Age: 86
End: 2021-01-06
Payer: MEDICARE

## 2021-01-06 VITALS
SYSTOLIC BLOOD PRESSURE: 123 MMHG | HEART RATE: 83 BPM | RESPIRATION RATE: 14 BRPM | DIASTOLIC BLOOD PRESSURE: 74 MMHG | TEMPERATURE: 98.5 F | OXYGEN SATURATION: 97 %

## 2021-01-06 DIAGNOSIS — U07.1 COVID-19 VIRUS INFECTION: Primary | ICD-10-CM

## 2021-01-06 DIAGNOSIS — J96.11 CHRONIC RESPIRATORY FAILURE WITH HYPOXIA, ON HOME OXYGEN THERAPY (HCC): ICD-10-CM

## 2021-01-06 DIAGNOSIS — J44.9 CHRONIC OBSTRUCTIVE PULMONARY DISEASE, UNSPECIFIED COPD TYPE (HCC): ICD-10-CM

## 2021-01-06 DIAGNOSIS — Z99.81 CHRONIC RESPIRATORY FAILURE WITH HYPOXIA, ON HOME OXYGEN THERAPY (HCC): ICD-10-CM

## 2021-01-06 LAB
ALBUMIN SERPL-MCNC: 3.5 G/DL (ref 3.5–5.2)
ALP BLD-CCNC: 64 U/L (ref 35–104)
ALT SERPL-CCNC: 13 U/L (ref 5–33)
ANION GAP SERPL CALCULATED.3IONS-SCNC: 12 MMOL/L (ref 7–19)
APTT: 26.8 SEC (ref 26–36.2)
AST SERPL-CCNC: 16 U/L (ref 5–32)
BASOPHILS ABSOLUTE: 0 K/UL (ref 0–0.2)
BASOPHILS RELATIVE PERCENT: 0.1 % (ref 0–1)
BILIRUB SERPL-MCNC: 0.3 MG/DL (ref 0.2–1.2)
BUN BLDV-MCNC: 21 MG/DL (ref 8–23)
C-REACTIVE PROTEIN: 12.39 MG/DL (ref 0–0.5)
CALCIUM SERPL-MCNC: 9.5 MG/DL (ref 8.2–9.6)
CHLORIDE BLD-SCNC: 101 MMOL/L (ref 98–111)
CO2: 30 MMOL/L (ref 22–29)
CREAT SERPL-MCNC: 0.9 MG/DL (ref 0.5–0.9)
D DIMER: 2.58 UG/ML FEU (ref 0–0.48)
EOSINOPHILS ABSOLUTE: 0 K/UL (ref 0–0.6)
EOSINOPHILS RELATIVE PERCENT: 0 % (ref 0–5)
FERRITIN: 315.3 NG/ML (ref 13–150)
FIBRINOGEN: 704 MG/DL (ref 238–505)
GFR AFRICAN AMERICAN: >59
GFR NON-AFRICAN AMERICAN: 58
GLUCOSE BLD-MCNC: 180 MG/DL (ref 74–109)
HCT VFR BLD CALC: 39.4 % (ref 37–47)
HEMOGLOBIN: 12.5 G/DL (ref 12–16)
IMMATURE GRANULOCYTES #: 0.1 K/UL
INR BLD: 0.88 (ref 0.88–1.18)
LACTATE DEHYDROGENASE: 187 U/L (ref 91–215)
LYMPHOCYTES ABSOLUTE: 0.4 K/UL (ref 1.1–4.5)
LYMPHOCYTES RELATIVE PERCENT: 2.8 % (ref 20–40)
MCH RBC QN AUTO: 28 PG (ref 27–31)
MCHC RBC AUTO-ENTMCNC: 31.7 G/DL (ref 33–37)
MCV RBC AUTO: 88.3 FL (ref 81–99)
MONOCYTES ABSOLUTE: 0.6 K/UL (ref 0–0.9)
MONOCYTES RELATIVE PERCENT: 3.8 % (ref 0–10)
NEUTROPHILS ABSOLUTE: 13.5 K/UL (ref 1.5–7.5)
NEUTROPHILS RELATIVE PERCENT: 92.7 % (ref 50–65)
PDW BLD-RTO: 13.3 % (ref 11.5–14.5)
PLATELET # BLD: 193 K/UL (ref 130–400)
PMV BLD AUTO: 10.1 FL (ref 9.4–12.3)
POTASSIUM REFLEX MAGNESIUM: 3.7 MMOL/L (ref 3.5–5)
PROCALCITONIN: 0.39 NG/ML (ref 0–0.09)
PROTHROMBIN TIME: 11.8 SEC (ref 12–14.6)
RBC # BLD: 4.46 M/UL (ref 4.2–5.4)
SODIUM BLD-SCNC: 143 MMOL/L (ref 136–145)
TOTAL PROTEIN: 6.7 G/DL (ref 6.6–8.7)
WBC # BLD: 14.5 K/UL (ref 4.8–10.8)

## 2021-01-06 PROCEDURE — 82728 ASSAY OF FERRITIN: CPT

## 2021-01-06 PROCEDURE — 96374 THER/PROPH/DIAG INJ IV PUSH: CPT

## 2021-01-06 PROCEDURE — 85379 FIBRIN DEGRADATION QUANT: CPT

## 2021-01-06 PROCEDURE — 80053 COMPREHEN METABOLIC PANEL: CPT

## 2021-01-06 PROCEDURE — 85730 THROMBOPLASTIN TIME PARTIAL: CPT

## 2021-01-06 PROCEDURE — 85025 COMPLETE CBC W/AUTO DIFF WBC: CPT

## 2021-01-06 PROCEDURE — 83615 LACTATE (LD) (LDH) ENZYME: CPT

## 2021-01-06 PROCEDURE — 85384 FIBRINOGEN ACTIVITY: CPT

## 2021-01-06 PROCEDURE — 86140 C-REACTIVE PROTEIN: CPT

## 2021-01-06 PROCEDURE — 36415 COLL VENOUS BLD VENIPUNCTURE: CPT

## 2021-01-06 PROCEDURE — 85610 PROTHROMBIN TIME: CPT

## 2021-01-06 PROCEDURE — 71045 X-RAY EXAM CHEST 1 VIEW: CPT

## 2021-01-06 PROCEDURE — 6360000002 HC RX W HCPCS: Performed by: EMERGENCY MEDICINE

## 2021-01-06 PROCEDURE — 84145 PROCALCITONIN (PCT): CPT

## 2021-01-06 PROCEDURE — 99284 EMERGENCY DEPT VISIT MOD MDM: CPT

## 2021-01-06 RX ORDER — PREDNISONE 20 MG/1
20 TABLET ORAL DAILY
COMMUNITY

## 2021-01-06 RX ORDER — ASCORBIC ACID 500 MG
500 TABLET ORAL DAILY
COMMUNITY

## 2021-01-06 RX ORDER — DEXAMETHASONE 6 MG/1
6 TABLET ORAL 2 TIMES DAILY WITH MEALS
Qty: 20 TABLET | Refills: 0 | Status: SHIPPED | OUTPATIENT
Start: 2021-01-06 | End: 2021-01-16

## 2021-01-06 RX ORDER — FAMOTIDINE 20 MG/1
20 TABLET, FILM COATED ORAL 2 TIMES DAILY
COMMUNITY

## 2021-01-06 RX ORDER — PREDNISONE 10 MG/1
10 TABLET ORAL DAILY
COMMUNITY

## 2021-01-06 RX ORDER — DEXAMETHASONE SODIUM PHOSPHATE 10 MG/ML
6 INJECTION, SOLUTION INTRAMUSCULAR; INTRAVENOUS ONCE
Status: COMPLETED | OUTPATIENT
Start: 2021-01-06 | End: 2021-01-06

## 2021-01-06 RX ORDER — ZINC SULFATE 50(220)MG
50 CAPSULE ORAL DAILY
COMMUNITY

## 2021-01-06 RX ORDER — PREDNISONE 20 MG/1
40 TABLET ORAL DAILY
COMMUNITY

## 2021-01-06 RX ORDER — LEVOFLOXACIN 750 MG/1
750 TABLET ORAL DAILY
COMMUNITY

## 2021-01-06 RX ORDER — ACETAMINOPHEN 160 MG
2000 TABLET,DISINTEGRATING ORAL DAILY
COMMUNITY

## 2021-01-06 RX ADMIN — DEXAMETHASONE SODIUM PHOSPHATE 6 MG: 10 INJECTION, SOLUTION INTRAMUSCULAR; INTRAVENOUS at 05:53

## 2021-01-06 ASSESSMENT — ENCOUNTER SYMPTOMS
COUGH: 1
RHINORRHEA: 0
ABDOMINAL PAIN: 0
VOMITING: 0
EYE PAIN: 0
SHORTNESS OF BREATH: 1
DIARRHEA: 0
EYE REDNESS: 0
VOICE CHANGE: 0

## 2021-01-06 NOTE — ED NOTES
Bed: 06  Expected date: 1/6/21  Expected time:   Means of arrival: [facility]  Comments:  Tobias funes+ sob sat 99%     Donna Mandel RN  01/06/21 [id number]

## 2021-01-06 NOTE — ED NOTES
Isolation precautions initiated. Isolation gown, mask, face shield, double gloves, bonnet and foot coverings worn by staff entering room. Patient placed in mask and instructed to wear mask during all contact.       Belvie Goltz, RN  01/06/21 [id number]

## 2021-01-06 NOTE — ED PROVIDER NOTES
[facility] EMERGENCY DEPT  EMERGENCY DEPARTMENT ENCOUNTER      Pt Name: Zeb Avalos  MRN: [id number]  [place] 4/2/1924  Date of evaluation: 1/6/2021  Provider: Cheikh Buck MD    CHIEF COMPLAINT       Chief Complaint   Patient presents with    Shortness of Breath     x 2 days, diagnosed with Covid pneumonia on 1/5 on PO Levaquin         HISTORY OF PRESENT ILLNESS   (Location/Symptom, Timing/Onset,Context/Setting, Quality, Duration, Modifying Factors, Severity)  Note limiting factors. Zeb Avalos is a 80 y.o. female who presents to the emergency department with complaint of shortness of breath. Patient was diagnosed with COVID-19 recently. Reports generalized malaise and fatigue with shortness of breath but no other significant symptoms. States she has had a cough but has been using inhalers with improvement. Denies any associated fever, vomiting, diarrhea, or other symptoms. Patient has a history of COPD and is on chronic oxygen therapy via nasal cannula. Patient was started on Levaquin recently as well. HPI    NursingNotes were reviewed. REVIEW OF SYSTEMS    (2-9 systems for level 4, 10 or more for level 5)     Review of Systems   Constitutional: Positive for fatigue. Negative for fever. HENT: Negative for congestion, rhinorrhea and voice change. Eyes: Negative for pain and redness. Respiratory: Positive for cough and shortness of breath. Cardiovascular: Negative for chest pain. Gastrointestinal: Negative for abdominal pain, diarrhea and vomiting. Endocrine: Negative. Genitourinary: Negative. Musculoskeletal: Negative for arthralgias and gait problem. Skin: Negative for rash and wound. Neurological: Negative for weakness and headaches. Hematological: Negative. Psychiatric/Behavioral: Negative. All other systems reviewed and are negative. A complete review of systems was performed and is negative except as noted above in the HPI.        PAST MEDICAL HISTORY     Past Medical History:   Diagnosis Date    Anemia     Arthritis     Back pain     COPD (chronic obstructive pulmonary disease) ([facility])     GERD (gastroesophageal reflux disease)     Glaucoma     Hypertension     Lumbar disc disease     Movement disorder     Palliative care patient 04/03/2019    Pneumonia     Popliteal nerve injury     Postural kyphosis     Reflux esophagitis     Respiratory failure with hypoxia ([facility])     Vitamin D deficiency          SURGICAL HISTORY       Past Surgical History:   Procedure Laterality Date    BREAST BIOPSY      EYE SURGERY           CURRENT MEDICATIONS       Previous Medications    ACETAMINOPHEN (TYLENOL) 500 MG TABLET    Take 1,000 mg by mouth every 6 hours as needed for Pain    ALBUTEROL-IPRATROPIUM (COMBIVENT RESPIMAT)  MCG/ACT AERS INHALER    Inhale 1 puff into the lungs every 6 hours    ALUMINUM HYDROXIDE-MAGNESIUM CARBONATE (GENATON)  MG/15ML SUSPENSION    Take 30 mLs by mouth every 4 hours as needed for Indigestion    AMLODIPINE (NORVASC) 5 MG TABLET    Take 5 mg by mouth 2 times daily     ASCORBIC ACID (VITAMIN C) 500 MG TABLET    Take 500 mg by mouth daily    BRIMONIDINE (ALPHAGAN) 0.2 % OPHTHALMIC SOLUTION    Place 1 drop into both eyes 2 times daily     BUDESONIDE-FORMOTEROL (SYMBICORT) 80-4.5 MCG/ACT AERO    Inhale 2 puffs into the lungs 2 times daily    CHOLECALCIFEROL (VITAMIN D3) 50 MCG (2000 UT) CAPS    Take 2,000 Units by mouth daily    DORZOLAMIDE (TRUSOPT) 2 % OPHTHALMIC SOLUTION    Place 2 drops into both eyes 2 times daily     FAMOTIDINE (PEPCID) 20 MG TABLET    Take 20 mg by mouth 2 times daily Indications: **per Covid 19 protocol**    FUROSEMIDE (LASIX) 20 MG TABLET    Take 20 mg by mouth daily     GUAIFENESIN (MUCINEX) 600 MG EXTENDED RELEASE TABLET    Take 1 tablet by mouth 2 times daily    IPRATROPIUM-ALBUTEROL (DUONEB) 0.5-2.5 (3) MG/3ML SOLN NEBULIZER SOLUTION    Inhale 1 vial into the lungs every 6 hours     LATANOPROST (XALATAN) 0.005 % OPHTHALMIC SOLUTION    Place 1 drop into both eyes nightly     LEVOFLOXACIN (LEVAQUIN) 750 MG TABLET    Take 750 mg by mouth daily Indications: Covid pneumonia X 7 days, started on 1/5/21    MAGNESIUM HYDROXIDE (MILK OF MAGNESIA) 400 MG/5ML SUSPENSION    Take 30 mLs by mouth every 6 hours as needed for Constipation    MELOXICAM (MOBIC) 15 MG TABLET    Take 15 mg by mouth daily    MULTIPLE VITAMINS-MINERALS (THERAPEUTIC MULTIVITAMIN-MINERALS W/ IRON) TABS TABLET    Take 1 tablet by mouth daily    OMEPRAZOLE (PRILOSEC) 20 MG DELAYED RELEASE CAPSULE    Take 20 mg by mouth daily    ONDANSETRON (ZOFRAN-ODT) 8 MG TBDP DISINTEGRATING TABLET    Place 4 mg under the tongue every 6 hours as needed for Nausea or Vomiting    PREDNISONE (DELTASONE) 10 MG TABLET    Take 10 mg by mouth daily    PREDNISONE (DELTASONE) 20 MG TABLET    Take 20 mg by mouth daily **Give 1 tablet,, daily for Resp issues related to Covid 19**    PREDNISONE (DELTASONE) 20 MG TABLET    Take 40 mg by mouth daily **for resp issues r/t Covid 19*    TRIAMCINOLONE (KENALOG) 0.1 % CREAM    Apply topically every 8 hours as needed (itching) Apply to back of neck    ZINC SULFATE (ZINCATE) 220 (50 ZN) MG CAPSULE    Take 50 mg by mouth daily Indications: x 60 days per Covid 19 protocol       ALLERGIES     Patient has no known allergies. FAMILY HISTORY       Family History   Problem Relation Age of Onset    Heart Disease Mother     Heart Attack Mother     Prostate Cancer Father           SOCIAL HISTORY       Social History     Socioeconomic History    Marital status:       Spouse name: None    Number of children: None    Years of education: None    Highest education level: None   Occupational History    None   Social Needs    Financial resource strain: None    Food insecurity     Worry: None     Inability: None    Transportation needs     Medical: None     Non-medical: None   Tobacco Use    Smoking status: Never Smoker    Smokeless tobacco: Never Used   Substance and Sexual Activity    Alcohol use: Never     Frequency: Never    Drug use: Never    Sexual activity: Not Currently   Lifestyle    Physical activity     Days per week: None     Minutes per session: None    Stress: None   Relationships    Social connections     Talks on phone: None     Gets together: None     Attends Muslim service: None     Active member of club or organization: None     Attends meetings of clubs or organizations: None     Relationship status: None    Intimate partner violence     Fear of current or ex partner: None     Emotionally abused: None     Physically abused: None     Forced sexual activity: None   Other Topics Concern    None   Social History Narrative    None       SCREENINGS    Jeff Coma Scale  Eye Opening: Spontaneous  Best Verbal Response: Oriented  Best Motor Response: Obeys commands  Jeff Coma Scale Score: 15        PHYSICAL EXAM    (up to 7 for level 4, 8 or more for level 5)     ED Triage Vitals [01/06/21 0511]   BP Temp Temp src Pulse Resp SpO2 Height Weight   (!) 132/59 98.5 °F (36.9 °C) -- 93 16 95 % -- --       Physical Exam  Vitals signs and nursing note reviewed. Constitutional:       General: She is not in acute distress. Appearance: She is well-developed. She is not toxic-appearing or diaphoretic. Interventions: Nasal cannula in place. Comments: Appears comfortable and generally nontoxic. Able speak in full sentences without difficulty   HENT:      Head: Normocephalic and atraumatic. Eyes:      General: No scleral icterus. Right eye: No discharge. Left eye: No discharge. Pupils: Pupils are equal, round, and reactive to light. Neck:      Musculoskeletal: Normal range of motion. Cardiovascular:      Rate and Rhythm: Normal rate and regular rhythm. Pulmonary:      Effort: Pulmonary effort is normal. No respiratory distress. Breath sounds: No stridor.       Comments: No increased work of breathing or respiratory distress  Abdominal:      General: There is no distension. Musculoskeletal: Normal range of motion. General: No deformity. Skin:     General: Skin is warm and dry. Neurological:      Mental Status: She is alert and oriented to person, place, and time. GCS: GCS eye subscore is 4. GCS verbal subscore is 5. GCS motor subscore is 6. Cranial Nerves: No cranial nerve deficit. Motor: No abnormal muscle tone. Psychiatric:         Behavior: Behavior normal.         Thought Content:  Thought content normal.         Judgment: Judgment normal.         DIAGNOSTIC RESULTS     EKG: All EKG's are interpreted by the Emergency Department Physician who either signs or Co-signs this chart in the absence of a cardiologist.        RADIOLOGY:   Non-plain film images such as CT, Ultrasound and MRI are read by the radiologist. Kirsten Durham images are visualized and preliminarily interpreted by the emergency physician with the below findings:      Interpretation per the Radiologist below, if available at the time of this note:    XR CHEST PORTABLE    (Results Pending)         ED BEDSIDE ULTRASOUND:   Performed by ED Physician - none    LABS:  Labs Reviewed   CBC WITH AUTO DIFFERENTIAL - Abnormal; Notable for the following components:       Result Value    WBC 14.5 (*)     MCHC 31.7 (*)     Neutrophils % 92.7 (*)     Lymphocytes % 2.8 (*)     Neutrophils Absolute 13.5 (*)     Lymphocytes Absolute 0.4 (*)     All other components within normal limits   COMPREHENSIVE METABOLIC PANEL W/ REFLEX TO MG FOR LOW K - Abnormal; Notable for the following components:    CO2 30 (*)     Glucose 180 (*)     GFR Non- 58 (*)     All other components within normal limits   PROTIME-INR - Abnormal; Notable for the following components:    Protime 11.8 (*)     All other components within normal limits   FIBRINOGEN - Abnormal; Notable for the following components:    Fibrinogen 704 (*)     All other components within normal limits   PROCALCITONIN - Abnormal; Notable for the following components:    Procalcitonin 0.39 (*)     All other components within normal limits   C-REACTIVE PROTEIN - Abnormal; Notable for the following components:    CRP 12.39 (*)     All other components within normal limits   D-DIMER, QUANTITATIVE - Abnormal; Notable for the following components:    D-Dimer, Quant 2.58 (*)     All other components within normal limits   FERRITIN - Abnormal; Notable for the following components:    Ferritin 315.3 (*)     All other components within normal limits   APTT   LACTATE DEHYDROGENASE   CBC WITH AUTO DIFFERENTIAL   COMPREHENSIVE METABOLIC PANEL W/ REFLEX TO MG FOR LOW K   PROTIME-INR   APTT   FIBRINOGEN   D-DIMER, QUANTITATIVE       All other labs were within normal range or not returned as of this dictation. Medications   dexamethasone (PF) (DECADRON) injection 6 mg (6 mg Intravenous Given 1/6/21 0553)       EMERGENCY DEPARTMENT COURSE and DIFFERENTIALDIAGNOSIS/MDM:   Vitals:    Vitals:    01/06/21 0511 01/06/21 0553 01/06/21 0629   BP: (!) 132/59 126/66 123/74   Pulse: 93 86 83   Resp: 16 15 14   Temp: 98.5 °F (36.9 °C)     SpO2: 95% 96% 97%       MDM      Oxygen saturation in the upper 90s here on nasal cannula. Laboratory evaluation consistent with known COVID-19 infection without any significant acute concerning findings. Chest x-ray shows some mild perihilar congestion but is not significantly changed from the previous chest x-ray for comparison. Patient has high risk for decompensation from the coronavirus given her age and underlying medical conditions, however at this time does not appear to be any reason for hospitalization. Given patient's underlying COPD and oxygen requirement will start her on Decadron.   Evaluation and work-up here revealed no signs of emergent or life-threatening pathology that would necessitate admission for further work-up or management at this time. Patient is felt to be stable for discharge home with return precautions for worsening of the condition or development of new concerning symptoms. Patient was encouraged to follow-up with their primary care doctor in the appropriate timeframe. Necessary prescriptions and information have been provided for treatment at home. Patient voices understanding and agreement with the plan. CONSULTS:  None    PROCEDURES:  Unless otherwise notedbelow, none     Procedures      FINAL IMPRESSION     1. COVID-19 virus infection    2. Chronic obstructive pulmonary disease, unspecified COPD type ([address]    3. Chronic respiratory failure with hypoxia, on home oxygen therapy [facility])          DISPOSITION/PLAN   DISPOSITION        PATIENT REFERRED TO:  [address] [place] [address] EMERGENCY DEPT  [facility]  [phone]    If symptoms worsen    [address]      As needed      DISCHARGE MEDICATIONS:  New Prescriptions    ASPIRIN 325 MG EC TABLET    Take 1 tablet by mouth daily for 14 days    DEXAMETHASONE (DECADRON) 6 MG TABLET    Take 1 tablet by mouth 2 times daily (with meals) for 10 days          (Please note that portions of this note were completed with a voice recognition program.  Efforts were made to edit the dictations butoccasionally words are mis-transcribed.)    Lorelei Villa MD (electronically signed)  AttendingEmerSaint Mary's Regional Medical Centercy Physician          Lorelei Villa., MD  01/06/21 3026

## 2021-01-06 NOTE — ED NOTES
Attempted to call report to NH. No answer.   Bedside report to [facility], RN  01/06/21 8149